# Patient Record
Sex: MALE | Race: WHITE | NOT HISPANIC OR LATINO | Employment: OTHER | URBAN - NONMETROPOLITAN AREA
[De-identification: names, ages, dates, MRNs, and addresses within clinical notes are randomized per-mention and may not be internally consistent; named-entity substitution may affect disease eponyms.]

---

## 2021-04-19 ENCOUNTER — HOSPITAL ENCOUNTER (OUTPATIENT)
Dept: RADIOLOGY | Facility: HOSPITAL | Age: 76
Discharge: HOME | End: 2021-04-19
Attending: INTERNAL MEDICINE
Payer: MEDICARE

## 2021-04-19 DIAGNOSIS — M25.519 SHOULDER PAIN: ICD-10-CM

## 2021-04-19 DIAGNOSIS — M25.519 SHOULDER PAIN: Primary | ICD-10-CM

## 2021-04-19 PROCEDURE — 73030 X-RAY EXAM OF SHOULDER: CPT | Mod: TC,RT

## 2021-04-19 PROCEDURE — 73030 X-RAY EXAM OF SHOULDER: CPT | Mod: TC,LT

## 2021-09-02 ENCOUNTER — HOSPITAL ENCOUNTER (EMERGENCY)
Facility: HOSPITAL | Age: 76
Discharge: HOME | End: 2021-09-02
Attending: EMERGENCY MEDICINE
Payer: MEDICARE

## 2021-09-02 VITALS
HEART RATE: 104 BPM | RESPIRATION RATE: 18 BRPM | WEIGHT: 213.81 LBS | OXYGEN SATURATION: 98 % | SYSTOLIC BLOOD PRESSURE: 120 MMHG | BODY MASS INDEX: 30.61 KG/M2 | DIASTOLIC BLOOD PRESSURE: 65 MMHG | HEIGHT: 70 IN | TEMPERATURE: 98 F
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
## 2021-09-02 DIAGNOSIS — M25.532 ACUTE PAIN OF LEFT WRIST: ICD-10-CM

## 2021-09-02 PROCEDURE — 25000003 PHARM REV CODE 250

## 2021-09-02 PROCEDURE — 99283 EMERGENCY DEPT VISIT LOW MDM: CPT | Mod: 25

## 2021-09-02 PROCEDURE — 29125 APPL SHORT ARM SPLINT STATIC: CPT | Mod: LT

## 2021-09-02 RX ORDER — OXYCODONE AND ACETAMINOPHEN 10; 325 MG/1; MG/1
TABLET ORAL
Status: COMPLETED
Start: 2021-09-02 | End: 2021-09-02

## 2021-09-02 RX ORDER — HYDROCODONE BITARTRATE AND ACETAMINOPHEN 10; 325 MG/1; MG/1
1 TABLET ORAL EVERY 6 HOURS PRN
Qty: 20 TABLET | Refills: 0 | Status: SHIPPED | OUTPATIENT
Start: 2021-09-02 | End: 2021-09-07

## 2021-09-02 RX ORDER — WARFARIN SODIUM 5 MG/1
5 TABLET ORAL DAILY
COMMUNITY
End: 2022-12-15

## 2021-09-02 RX ORDER — WARFARIN 2 MG/1
2 TABLET ORAL DAILY
COMMUNITY
End: 2022-12-15

## 2021-09-02 RX ADMIN — OXYCODONE AND ACETAMINOPHEN: 10; 325 TABLET ORAL at 09:09

## 2022-08-05 PROBLEM — R87.810 CERVICAL HIGH RISK HUMAN PAPILLOMAVIRUS (HPV) DNA TEST POSITIVE: Status: ACTIVE | Noted: 2022-08-05

## 2022-08-05 PROBLEM — J30.9 ALLERGIC RHINITIS: Status: ACTIVE | Noted: 2022-08-05

## 2022-08-05 PROBLEM — R53.83 MALAISE AND FATIGUE: Status: ACTIVE | Noted: 2022-08-05

## 2022-08-05 PROBLEM — R53.81 MALAISE AND FATIGUE: Status: ACTIVE | Noted: 2022-08-05

## 2022-08-05 PROBLEM — M10.9 GOUT: Status: ACTIVE | Noted: 2022-08-05

## 2022-08-05 PROBLEM — B00.9 HERPES SIMPLEX: Status: ACTIVE | Noted: 2022-08-05

## 2023-07-05 ENCOUNTER — LAB VISIT (OUTPATIENT)
Dept: LAB | Facility: HOSPITAL | Age: 78
End: 2023-07-05
Attending: INTERNAL MEDICINE
Payer: MEDICARE

## 2023-07-05 DIAGNOSIS — Z79.01 LONG TERM (CURRENT) USE OF ANTICOAGULANTS: Primary | ICD-10-CM

## 2023-07-05 LAB
INR PPP: 2.3 (ref 0.8–1.2)
PROTHROMBIN TIME: 23.1 SEC (ref 9–12.5)

## 2023-07-05 PROCEDURE — 36415 COLL VENOUS BLD VENIPUNCTURE: CPT | Performed by: INTERNAL MEDICINE

## 2023-07-05 PROCEDURE — 85610 PROTHROMBIN TIME: CPT | Performed by: INTERNAL MEDICINE

## 2023-08-23 ENCOUNTER — LAB VISIT (OUTPATIENT)
Dept: LAB | Facility: HOSPITAL | Age: 78
End: 2023-08-23
Attending: INTERNAL MEDICINE
Payer: MEDICARE

## 2023-08-23 DIAGNOSIS — I48.20 CHRONIC ATRIAL FIBRILLATION: Primary | ICD-10-CM

## 2023-08-23 LAB
INR PPP: 2.8 (ref 0.8–1.2)
PROTHROMBIN TIME: 25.9 SEC (ref 9–12.5)

## 2023-08-23 PROCEDURE — 85610 PROTHROMBIN TIME: CPT | Performed by: INTERNAL MEDICINE

## 2023-08-23 PROCEDURE — 36415 COLL VENOUS BLD VENIPUNCTURE: CPT | Performed by: INTERNAL MEDICINE

## 2023-09-20 ENCOUNTER — LAB VISIT (OUTPATIENT)
Dept: LAB | Facility: HOSPITAL | Age: 78
End: 2023-09-20
Attending: INTERNAL MEDICINE
Payer: MEDICARE

## 2023-09-20 DIAGNOSIS — I48.20 CHRONIC ATRIAL FIBRILLATION: Primary | ICD-10-CM

## 2023-09-20 LAB
INR PPP: 2.9 (ref 0.8–1.2)
PROTHROMBIN TIME: 29.2 SEC (ref 9–12.5)

## 2023-09-20 PROCEDURE — 36415 COLL VENOUS BLD VENIPUNCTURE: CPT | Performed by: INTERNAL MEDICINE

## 2023-10-04 ENCOUNTER — PATIENT MESSAGE (OUTPATIENT)
Dept: ADMINISTRATIVE | Facility: OTHER | Age: 78
End: 2023-10-04
Payer: MEDICARE

## 2023-10-18 ENCOUNTER — LAB VISIT (OUTPATIENT)
Dept: LAB | Facility: HOSPITAL | Age: 78
End: 2023-10-18
Attending: INTERNAL MEDICINE
Payer: MEDICARE

## 2023-10-18 DIAGNOSIS — I48.20 CHRONIC ATRIAL FIBRILLATION: Primary | ICD-10-CM

## 2023-10-18 LAB
INR PPP: 2.7 (ref 0.8–1.2)
PROTHROMBIN TIME: 24.7 SEC (ref 9–12.5)

## 2023-10-18 PROCEDURE — 36415 COLL VENOUS BLD VENIPUNCTURE: CPT | Performed by: INTERNAL MEDICINE

## 2023-10-18 PROCEDURE — 85610 PROTHROMBIN TIME: CPT | Performed by: INTERNAL MEDICINE

## 2023-11-22 ENCOUNTER — LAB VISIT (OUTPATIENT)
Dept: LAB | Facility: HOSPITAL | Age: 78
End: 2023-11-22
Attending: INTERNAL MEDICINE
Payer: MEDICARE

## 2023-11-22 DIAGNOSIS — I48.91 A-FIB: ICD-10-CM

## 2023-11-22 LAB
INR PPP: 2.5 (ref 0.8–1.2)
PROTHROMBIN TIME: 22.9 SEC (ref 9–12.5)

## 2023-11-22 PROCEDURE — 85610 PROTHROMBIN TIME: CPT | Performed by: INTERNAL MEDICINE

## 2023-11-22 PROCEDURE — 36415 COLL VENOUS BLD VENIPUNCTURE: CPT | Performed by: INTERNAL MEDICINE

## 2023-12-20 ENCOUNTER — LAB VISIT (OUTPATIENT)
Dept: LAB | Facility: HOSPITAL | Age: 78
End: 2023-12-20
Attending: INTERNAL MEDICINE
Payer: MEDICARE

## 2023-12-20 DIAGNOSIS — I48.20 CHRONIC ATRIAL FIBRILLATION: Primary | ICD-10-CM

## 2023-12-20 LAB
INR PPP: 3.2 (ref 0.8–1.2)
PROTHROMBIN TIME: 29.1 SEC (ref 9–12.5)

## 2023-12-20 PROCEDURE — 36415 COLL VENOUS BLD VENIPUNCTURE: CPT | Performed by: INTERNAL MEDICINE

## 2023-12-20 PROCEDURE — 85610 PROTHROMBIN TIME: CPT | Performed by: INTERNAL MEDICINE

## 2024-02-01 ENCOUNTER — LAB VISIT (OUTPATIENT)
Dept: LAB | Facility: HOSPITAL | Age: 79
End: 2024-02-01
Attending: INTERNAL MEDICINE
Payer: MEDICARE

## 2024-02-01 DIAGNOSIS — Z79.01 LONG TERM (CURRENT) USE OF ANTICOAGULANTS: Primary | ICD-10-CM

## 2024-02-01 LAB
INR PPP: 4 (ref 0.8–1.2)
PROTHROMBIN TIME: 39.8 SEC (ref 9–12.5)

## 2024-02-01 PROCEDURE — 85610 PROTHROMBIN TIME: CPT | Performed by: INTERNAL MEDICINE

## 2024-02-01 PROCEDURE — 36415 COLL VENOUS BLD VENIPUNCTURE: CPT | Performed by: INTERNAL MEDICINE

## 2024-02-02 ENCOUNTER — LAB VISIT (OUTPATIENT)
Dept: LAB | Facility: HOSPITAL | Age: 79
End: 2024-02-02
Attending: INTERNAL MEDICINE
Payer: MEDICARE

## 2024-02-02 DIAGNOSIS — E11.9 TYPE 2 DIABETES MELLITUS WITHOUT COMPLICATIONS: ICD-10-CM

## 2024-02-02 DIAGNOSIS — E11.69 TYPE 2 DIABETES MELLITUS WITH OTHER SPECIFIED COMPLICATION, WITH LONG-TERM CURRENT USE OF INSULIN: ICD-10-CM

## 2024-02-02 DIAGNOSIS — Z12.5 SCREENING PSA (PROSTATE SPECIFIC ANTIGEN): ICD-10-CM

## 2024-02-02 DIAGNOSIS — Z13.220 SCREENING FOR LIPOID DISORDERS: ICD-10-CM

## 2024-02-02 DIAGNOSIS — I10 PRIMARY HYPERTENSION: ICD-10-CM

## 2024-02-02 DIAGNOSIS — Z13.29 SCREENING FOR THYROID DISORDER: ICD-10-CM

## 2024-02-02 DIAGNOSIS — Z79.4 TYPE 2 DIABETES MELLITUS WITH OTHER SPECIFIED COMPLICATION, WITH LONG-TERM CURRENT USE OF INSULIN: ICD-10-CM

## 2024-02-02 LAB
ALBUMIN SERPL BCP-MCNC: 3.7 G/DL (ref 3.5–5.2)
ALP SERPL-CCNC: 47 U/L (ref 55–135)
ALT SERPL W/O P-5'-P-CCNC: 57 U/L (ref 10–44)
ANION GAP SERPL CALC-SCNC: 6 MMOL/L (ref 3–11)
AST SERPL-CCNC: 30 U/L (ref 10–40)
BASOPHILS # BLD AUTO: 0.05 K/UL (ref 0–0.2)
BASOPHILS NFR BLD: 0.8 % (ref 0–1.9)
BILIRUB SERPL-MCNC: 1.6 MG/DL (ref 0.1–1)
BUN SERPL-MCNC: 12 MG/DL (ref 8–23)
CALCIUM SERPL-MCNC: 9.1 MG/DL (ref 8.7–10.5)
CHLORIDE SERPL-SCNC: 104 MMOL/L (ref 95–110)
CHOLEST SERPL-MCNC: 122 MG/DL (ref 120–199)
CHOLEST/HDLC SERPL: 2.2 {RATIO} (ref 2–5)
CO2 SERPL-SCNC: 28 MMOL/L (ref 23–29)
COMPLEXED PSA SERPL-MCNC: 1.3 NG/ML (ref 0–4)
CREAT SERPL-MCNC: 0.9 MG/DL (ref 0.5–1.4)
DIFFERENTIAL METHOD BLD: ABNORMAL
EOSINOPHIL # BLD AUTO: 0.3 K/UL (ref 0–0.5)
EOSINOPHIL NFR BLD: 5.8 % (ref 0–8)
ERYTHROCYTE [DISTWIDTH] IN BLOOD BY AUTOMATED COUNT: 14 % (ref 11.5–14.5)
EST. GFR  (NO RACE VARIABLE): >60 ML/MIN/1.73 M^2
GLUCOSE SERPL-MCNC: 141 MG/DL (ref 70–110)
HCT VFR BLD AUTO: 45.9 % (ref 40–54)
HDLC SERPL-MCNC: 55 MG/DL (ref 40–75)
HDLC SERPL: 45.1 % (ref 20–50)
HGB BLD-MCNC: 15.5 G/DL (ref 14–18)
IMM GRANULOCYTES # BLD AUTO: 0.02 K/UL (ref 0–0.04)
IMM GRANULOCYTES NFR BLD AUTO: 0.3 % (ref 0–0.5)
LDLC SERPL CALC-MCNC: 53.2 MG/DL (ref 63–159)
LYMPHOCYTES # BLD AUTO: 1.7 K/UL (ref 1–4.8)
LYMPHOCYTES NFR BLD: 28.6 % (ref 18–48)
MCH RBC QN AUTO: 31.1 PG (ref 27–31)
MCHC RBC AUTO-ENTMCNC: 33.8 G/DL (ref 32–36)
MCV RBC AUTO: 92 FL (ref 82–98)
MONOCYTES # BLD AUTO: 0.6 K/UL (ref 0.3–1)
MONOCYTES NFR BLD: 10 % (ref 4–15)
NEUTROPHILS # BLD AUTO: 3.2 K/UL (ref 1.8–7.7)
NEUTROPHILS NFR BLD: 54.5 % (ref 38–73)
NONHDLC SERPL-MCNC: 67 MG/DL
NRBC BLD-RTO: 0 /100 WBC
PLATELET # BLD AUTO: 161 K/UL (ref 150–450)
PMV BLD AUTO: 10.9 FL (ref 9.2–12.9)
POTASSIUM SERPL-SCNC: 4 MMOL/L (ref 3.5–5.1)
PROT SERPL-MCNC: 7.7 G/DL (ref 6–8.4)
RBC # BLD AUTO: 4.98 M/UL (ref 4.6–6.2)
SODIUM SERPL-SCNC: 138 MMOL/L (ref 136–145)
TRIGL SERPL-MCNC: 69 MG/DL (ref 30–150)
TSH SERPL DL<=0.005 MIU/L-ACNC: 3.42 UIU/ML (ref 0.4–4)
WBC # BLD AUTO: 5.91 K/UL (ref 3.9–12.7)

## 2024-02-02 PROCEDURE — 80061 LIPID PANEL: CPT | Performed by: INTERNAL MEDICINE

## 2024-02-02 PROCEDURE — 36415 COLL VENOUS BLD VENIPUNCTURE: CPT | Performed by: INTERNAL MEDICINE

## 2024-02-02 PROCEDURE — 84443 ASSAY THYROID STIM HORMONE: CPT | Performed by: INTERNAL MEDICINE

## 2024-02-02 PROCEDURE — 80053 COMPREHEN METABOLIC PANEL: CPT | Performed by: INTERNAL MEDICINE

## 2024-02-02 PROCEDURE — 84153 ASSAY OF PSA TOTAL: CPT | Performed by: INTERNAL MEDICINE

## 2024-02-02 PROCEDURE — 85025 COMPLETE CBC W/AUTO DIFF WBC: CPT | Performed by: INTERNAL MEDICINE

## 2024-02-15 ENCOUNTER — LAB VISIT (OUTPATIENT)
Dept: LAB | Facility: HOSPITAL | Age: 79
End: 2024-02-15
Attending: INTERNAL MEDICINE
Payer: MEDICARE

## 2024-02-15 DIAGNOSIS — I48.20 CHRONIC ATRIAL FIBRILLATION: ICD-10-CM

## 2024-02-15 DIAGNOSIS — Z79.01 LONG TERM (CURRENT) USE OF ANTICOAGULANTS: ICD-10-CM

## 2024-02-15 LAB
INR PPP: 2.5 (ref 0.8–1.2)
PROTHROMBIN TIME: 24.8 SEC (ref 9–12.5)

## 2024-02-15 PROCEDURE — 36415 COLL VENOUS BLD VENIPUNCTURE: CPT | Performed by: INTERNAL MEDICINE

## 2024-02-15 PROCEDURE — 85610 PROTHROMBIN TIME: CPT | Performed by: INTERNAL MEDICINE

## 2024-06-07 ENCOUNTER — HOSPITAL ENCOUNTER (EMERGENCY)
Facility: HOSPITAL | Age: 79
Discharge: HOME | End: 2024-06-07
Attending: EMERGENCY MEDICINE
Payer: MEDICARE

## 2024-06-07 VITALS
DIASTOLIC BLOOD PRESSURE: 71 MMHG | BODY MASS INDEX: 30.64 KG/M2 | WEIGHT: 214 LBS | HEIGHT: 70 IN | RESPIRATION RATE: 20 BRPM | HEART RATE: 99 BPM | TEMPERATURE: 99 F | SYSTOLIC BLOOD PRESSURE: 129 MMHG | OXYGEN SATURATION: 98 %

## 2024-06-07 DIAGNOSIS — R05.9 COUGH: Primary | ICD-10-CM

## 2024-06-07 LAB
CTP QC/QA: YES
CTP QC/QA: YES
POC MOLECULAR INFLUENZA A AGN: NEGATIVE
POC MOLECULAR INFLUENZA B AGN: NEGATIVE
SARS-COV-2 RDRP RESP QL NAA+PROBE: NEGATIVE

## 2024-06-07 PROCEDURE — 63600175 PHARM REV CODE 636 W HCPCS: Performed by: EMERGENCY MEDICINE

## 2024-06-07 PROCEDURE — 96372 THER/PROPH/DIAG INJ SC/IM: CPT | Performed by: EMERGENCY MEDICINE

## 2024-06-07 PROCEDURE — 99284 EMERGENCY DEPT VISIT MOD MDM: CPT | Mod: 25

## 2024-06-07 PROCEDURE — 87502 INFLUENZA DNA AMP PROBE: CPT

## 2024-06-07 PROCEDURE — 87635 SARS-COV-2 COVID-19 AMP PRB: CPT | Performed by: EMERGENCY MEDICINE

## 2024-06-07 RX ORDER — METHYLPREDNISOLONE ACETATE 80 MG/ML
80 INJECTION, SUSPENSION INTRA-ARTICULAR; INTRALESIONAL; INTRAMUSCULAR; SOFT TISSUE
Status: COMPLETED | OUTPATIENT
Start: 2024-06-07 | End: 2024-06-07

## 2024-06-07 RX ORDER — DOXYCYCLINE 100 MG/1
100 CAPSULE ORAL 2 TIMES DAILY
Qty: 20 CAPSULE | Refills: 0 | Status: SHIPPED | OUTPATIENT
Start: 2024-06-07 | End: 2024-06-17

## 2024-06-07 RX ORDER — PROMETHAZINE HYDROCHLORIDE AND DEXTROMETHORPHAN HYDROBROMIDE 6.25; 15 MG/5ML; MG/5ML
5 SYRUP ORAL EVERY 6 HOURS PRN
Qty: 118 ML | Refills: 0 | Status: SHIPPED | OUTPATIENT
Start: 2024-06-07 | End: 2024-06-17

## 2024-06-07 RX ADMIN — METHYLPREDNISOLONE ACETATE 80 MG: 80 INJECTION, SUSPENSION INTRA-ARTICULAR; INTRALESIONAL; INTRAMUSCULAR; SOFT TISSUE at 04:06

## 2024-06-07 NOTE — ED PROVIDER NOTES
Encounter Date: 6/7/2024       History     Chief Complaint   Patient presents with    General Illness     Reports generalized body aches, chills, runny nose, subjective fever since around 0100 this morning. Denies cough, n/v/d     79-year-old male complaining of cough cold congestion body aches chills runny nose subjective fever since earlier this morning.  No other issues.  No chest pain or shortness of breath.  No nausea vomiting diarrhea.  Not ill appearing, afebrile here.  Speaking in full sentences without issue.  Alert oriented x4, GCS is 15      Review of patient's allergies indicates:   Allergen Reactions    Ace inhibitors      Past Medical History:   Diagnosis Date    Abnormal granulation tissue     Actinic keratosis     Anxiety disorder     Arthralgia of shoulder     Atrial fibrillation     CAD (coronary artery disease)     Cataract     Cervical pain (neck)     Colon polyp     Diverticulosis of large intestine without perforation or abscess without bleeding     Edema     Erythema nodosum     Excessive daytime sleepiness     Headache     Herpes simplex     High cholesterol     HLD (hyperlipidemia)     HPV in male     HTN (hypertension)     Irritability     Lumbar pain     Lumbosacral radiculopathy     Major depressive disorder     Nocturia     HENRI (obstructive sleep apnea)     Osteoarthritis of knee     Palpitations     Rotator cuff arthropathy     Shoulder pain     Thoracic radiculopathy     Type II diabetes mellitus     Urinary frequency      Past Surgical History:   Procedure Laterality Date    COLONOSCOPY  2018    EYE SURGERY  06/20/2022    Approximate date    PROSTATE REDUCTION   2013    Sue     Family History   Problem Relation Name Age of Onset    Diabetes Mother      Coronary artery disease Father Panda Sr     Heart attack Father Panda Sr     Heart disease Father Panda Sr     Cancer Sister          lung    Diabetes Sister      Heart attack Brother      Diabetes Brother      Coronary artery  disease Brother      Cancer Daughter Aliya         Breast cancer w/ mastectomy    Depression Daughter Aliya     Diabetes Daughter Aliya     Heart disease Daughter Aliya         CABG    Hyperlipidemia Daughter Aliya     Miscarriages / Stillbirths Daughter Aliya     Depression Son Lou     Diabetes Son Lou     Heart disease Son Lou         CABG    Hypertension Son Lou      Social History     Tobacco Use    Smoking status: Never    Smokeless tobacco: Never   Substance Use Topics    Alcohol use: Yes     Alcohol/week: 20.0 standard drinks of alcohol     Types: 20 Cans of beer per week    Drug use: Never     Review of Systems   Constitutional:  Negative for fever.   HENT:  Positive for congestion. Negative for sore throat.    Respiratory:  Positive for cough. Negative for shortness of breath.    Cardiovascular:  Negative for chest pain.   Gastrointestinal:  Negative for nausea.   Genitourinary:  Negative for dysuria.   Musculoskeletal:  Negative for back pain.   Skin:  Negative for rash.   Neurological:  Negative for weakness.   Hematological:  Does not bruise/bleed easily.   All other systems reviewed and are negative.      Physical Exam     Initial Vitals [06/07/24 1534]   BP Pulse Resp Temp SpO2   129/71 107 18 98.5 °F (36.9 °C) (!) 94 %      MAP       --         Physical Exam    Nursing note and vitals reviewed.  Constitutional: He appears well-developed and well-nourished. He is not diaphoretic. No distress.   HENT:   Head: Normocephalic and atraumatic.   Eyes: Conjunctivae and EOM are normal. Pupils are equal, round, and reactive to light. Right eye exhibits no discharge. Left eye exhibits no discharge. No scleral icterus.   Neck: Neck supple. No JVD present.   Normal range of motion.  Cardiovascular:  Normal rate, normal heart sounds and intact distal pulses.           No murmur heard.  Pulmonary/Chest: Breath sounds normal. No stridor. No respiratory distress. He has no wheezes. He has no rhonchi. He has no  rales. He exhibits no tenderness.   Abdominal: Abdomen is soft. Bowel sounds are normal. He exhibits no distension and no mass. There is no abdominal tenderness. There is no rebound and no guarding.   Musculoskeletal:         General: No tenderness or edema. Normal range of motion.      Cervical back: Normal range of motion and neck supple.     Neurological: He is alert and oriented to person, place, and time. He has normal strength. GCS score is 15. GCS eye subscore is 4. GCS verbal subscore is 5. GCS motor subscore is 6.   Skin: Skin is warm and dry. Capillary refill takes less than 2 seconds.         ED Course   Procedures  Labs Reviewed   SARS-COV-2 RDRP GENE    Narrative:     ..This test utilizes isothermal nucleic acid amplification technology to detect the SARS-CoV-2 RdRp nucleic acid segment. The analytical sensitivity (limit of detection) is 500 copies/swab.     A POSITIVE result is indicative of the presence of SARS-CoV-2 RNA; clinical correlation with patient history and other diagnostic information is necessary to determine patient infection status.    A NEGATIVE result means that SARS-CoV-2 nucleic acids are not present above the limit of detection. A NEGATIVE result should be treated as presumptive. It does not rule out the possibility of COVID-19 and should not be the sole basis for treatment decisions. If COVID-19 is strongly suspected based on clinical and exposure history, re-testing using an alternate molecular assay should be considered.     Commercial kits are provided by [employer].   _________________________________________________________________   The authorized Fact Sheet for Healthcare Providers and the authorized Fact Sheet for Patients of the ID NOW COVID-19 are available on the FDA website:    https://www.fda.gov/media/257618/download      https://www.fda.gov/media/184945/download       POCT INFLUENZA A/B MOLECULAR          Imaging Results              X-Ray Chest 1 View (In  process)                      Medications   methylPREDNISolone acetate injection 80 mg (80 mg Intramuscular Given 6/7/24 1619)     Medical Decision Making  Amount and/or Complexity of Data Reviewed  Labs: ordered.  Radiology: ordered.    Risk  Prescription drug management.               ED Course as of 06/07/24 1636   Fri Jun 07, 2024   1633 Chest x-ray appears negative.  Will cover with an antibiotic as well as cough medicine.  He is stable for discharge and follow-up to primary care physician.  Oxygen saturation is 97% on room air here in the emergency department.  Speaking in full sentences without issue [SD]      ED Course User Index  [SD] Murali Owens MD               Medical Decision Making:   Differential Diagnosis:   URI, viral syndrome, COVID-19, influenza             Clinical Impression:  Final diagnoses:  [R05.9] Cough (Primary)          ED Disposition Condition    Discharge Stable          ED Prescriptions       Medication Sig Dispense Start Date End Date Auth. Provider    doxycycline (VIBRAMYCIN) 100 MG Cap Take 1 capsule (100 mg total) by mouth 2 (two) times daily. for 10 days 20 capsule 6/7/2024 6/17/2024 Murali Owens MD    promethazine-dextromethorphan (PROMETHAZINE-DM) 6.25-15 mg/5 mL Syrp Take 5 mLs by mouth every 6 (six) hours as needed (Cough). 118 mL 6/7/2024 6/17/2024 Murali Owens MD          Follow-up Information       Follow up With Specialties Details Why Contact Info Additional Information    Amol Ellis III, MD Internal Medicine In 2 days  [address] [facility] [address]  [phone]       [place] - Emergency Department Emergency Medicine  As needed, If symptoms worsen [facility] [facility] [id number] [phone] Floor 1             Murali Owens MD  06/07/24 1636

## 2024-06-10 ENCOUNTER — PATIENT OUTREACH (OUTPATIENT)
Dept: EMERGENCY MEDICINE | Facility: HOSPITAL | Age: 79
End: 2024-06-10
Payer: MEDICARE

## 2024-06-10 NOTE — PROGRESS NOTES
Lauren Doan Patient Care Assistant  ED Navigator  Emergency Department    [place]: [facility] [place]  Role: Community Health Worker    Date: 06/10/2024  Patient Name: Tristian Ramirez  MRN: [id number]  PCP: Amol Ellis III, MD    Assessment:     Tristian Ramirez is a 79 y.o. male who has presented to ED for a general illness. Patient has visited the ED 1 times in the past 3 months. Patient did not contact PCP.     ED Navigator Initial Assessment    ED Navigator Enrollment Documentation  Consent to Services  Does patient consent to completing the assessment?: Yes  Contact  Method of Initial Contact: Phone  Transportation  Does the patient have issues with Transportation?: No  Does the patient have transportation to and from healthcare appointments?: Yes  Insurance Coverage  Do you have coverage/adequate coverage?: Yes  Type/kind of coverage: MEDICARE PART A & B  Is patient able to afford co-pays/deductibles?: Yes  Is patient able to afford HME or supplies?: Yes  Does patient have an established [facility] PCP?: Yes  Able to access?: Yes  Does the patient have a lack of adequate coverage?: No  Specialist Appointment  Did the patient come to the ED to see a specialist?: No  Does the patient have a pending specialist referral?: No  Does the patient have a specialist appointment made?: No  PCP Follow Up Appointment  Has the patient had an appointment with a primary care provider in the past year?: Yes  Approximate date: 2/2/24  Provider: Amol Ellis III, MD  Does the patient have a follow up appontment with a PCP?: Yes  Upcoming appointment date:  (Comment: pt is unsure.)  Provider: Amol Ellis III, MD  When was the last time you saw your PCP?: 2/2/24  Medications  Is patient able to afford medication?: Yes  Is patient unable to get medication due to lack of transportation?: No  Psychological  Does the patient have psycho-social concerns?: No  Food  Does the patient have concerns about food?:  Patient called requesting results from visit on 6/7/21. Can be reached at [phone].   No  Communication/Education  Does the patient have limited English proficiency/English not primary language?: No  Does patient have low literacy and/or low health literacy?: Yes  Does patient have concerns with care?: No  Does patient have dissatisfaction with care?: No  Other Financial Concerns  Does the patient have immediate financial distress?: No  Does the patient have general financial concerns?: No  Other Social Barriers/Concerns  Does the patient have any additional barriers or concerns?: None  Primary Barrier  Barriers identified: Financial barrier (health insurance, employment, etc.), Structural barrier (service availability, waiting times, etc.), Cognitive barrier (health literacy, language and communication, etc.)  Root Cause of ED Utilization: Chronic Conditions  Plan to address Chronic Conditions: Encourage patient to contact PCP/specialist for follow up per ED discharge instructions  Next steps: Provided Education  Was education/educational materials provided surrounding PCP services/creating a medical home?: Yes Was education verbal or written?: Verbal, Written     Was education/educational materials provided surrounding low cost, healthy foods?: Yes Was education verbal or written?: Written     Was education/educational materials provided surrounding other items? If so, use comment to explain.: Yes Was education verbal or written?: Written   Plan: Provided information for [facility] On Call 24/7 Nurse triage line, [phone] or 1-866-[facility] ([phone])  Expected Date of Follow Up 1: 7/22/24  Additional Documentation: Pt is well. Pt stated he has been sleeping real good, and does not have any more symptoms. Pt declined assistance to scheduling any appts. Pt has no issues with transportation or insurance, as well as medications. Pt does not use e-mail. Pt has no need for resources at this time. Pt is agreeable to follow-up calls.    ED navigator ensured there was nothing she could help patient with  today. ED navigator offered to schedule appts. ED navigator provided patient with the following via mail: her contact information, the [facility] On Call 24/7 Nurse triage line, [phone] or 1-866-[facility] ([phone]) contact information, and education on (The Right Care at the Right Level information, [facility] Virtual Visit information, and Heart healthy diet tips). ED navigator will follow-up with patient on/around 7/22/2024.    Lauren Doan  ED Navigator  [phone]            Social History     Socioeconomic History    Marital status: Single   Tobacco Use    Smoking status: Never    Smokeless tobacco: Never   Substance and Sexual Activity    Alcohol use: Yes     Alcohol/week: 20.0 standard drinks of alcohol     Types: 20 Cans of beer per week    Drug use: Never     Social Determinants of Health     Financial Resource Strain: Low Risk  (6/10/2024)    Overall Financial Resource Strain (CARDIA)     Difficulty of Paying Living Expenses: Not hard at all   Food Insecurity: No Food Insecurity (6/10/2024)    Hunger Vital Sign     Worried About Running Out of Food in the Last Year: Never true     Ran Out of Food in the Last Year: Never true   Transportation Needs: No Transportation Needs (6/10/2024)    PRAPARE - Transportation     Lack of Transportation (Medical): No     Lack of Transportation (Non-Medical): No   Physical Activity: Patient Unable To Answer (6/10/2024)    Exercise Vital Sign     Days of Exercise per Week: Patient unable to answer     Minutes of Exercise per Session: Patient unable to answer   Stress: No Stress Concern Present (6/10/2024)    [employer] - Occupational Stress Questionnaire     Feeling of Stress : Not at all   Housing Stability: Low Risk  (6/10/2024)    Housing Stability Vital Sign     Unable to Pay for Housing in the Last Year: No     Homeless in the Last Year: No       Plan:     Pt is well. Pt stated he has been sleeping real good, and does not  have any more symptoms. Pt declined assistance to scheduling any appts. Pt has no issues with transportation or insurance, as well as medications. Pt does not use e-mail. Pt has no need for resources at this time. Pt is agreeable to follow-up calls.    ED navigator ensured there was nothing she could help patient with today. ED navigator offered to schedule appts. ED navigator provided patient with the following via mail: her contact information, the [facility] On Call 24/7 Nurse triage line, [phone] or 1-866-[facility] ([phone]) contact information, and education on (The Right Care at the Right Level information, [employer] Virtual Visit information, and Heart healthy diet tips). ED navigator will follow-up with patient on/around 7/22/2024.    Lauren Doan  ED Navigator  [phone]

## 2024-07-22 ENCOUNTER — PATIENT OUTREACH (OUTPATIENT)
Dept: EMERGENCY MEDICINE | Facility: HOSPITAL | Age: 79
End: 2024-07-22
Payer: MEDICARE

## 2024-07-22 NOTE — PROGRESS NOTES
Pt is unreachable for 1st F/U. ED navigator will follow-up with patient on/around 8/27/2024 for attempt at 2nd.    Lauren Doan  ED Navigator  [phone]

## 2024-08-26 ENCOUNTER — PATIENT OUTREACH (OUTPATIENT)
Dept: EMERGENCY MEDICINE | Facility: HOSPITAL | Age: 79
End: 2024-08-26
Payer: MEDICARE

## 2024-08-26 NOTE — PROGRESS NOTES
Pt is unreachable for 2nd F/U. ED navigator will follow-up with patient on/around 10/1/2024 for attempt at 3rd.     Lauren Doan  ED Navigator  [phone]

## 2024-10-01 ENCOUNTER — PATIENT OUTREACH (OUTPATIENT)
Dept: EMERGENCY MEDICINE | Facility: HOSPITAL | Age: 79
End: 2024-10-01
Payer: MEDICARE

## 2025-02-02 ENCOUNTER — HOSPITAL ENCOUNTER (EMERGENCY)
Facility: HOSPITAL | Age: 80
Discharge: HOME | End: 2025-02-02
Attending: EMERGENCY MEDICINE
Payer: MEDICARE

## 2025-02-02 VITALS
HEART RATE: 76 BPM | RESPIRATION RATE: 18 BRPM | SYSTOLIC BLOOD PRESSURE: 115 MMHG | WEIGHT: 205 LBS | DIASTOLIC BLOOD PRESSURE: 65 MMHG | TEMPERATURE: 98 F | OXYGEN SATURATION: 98 % | HEIGHT: 70 IN | BODY MASS INDEX: 29.35 KG/M2

## 2025-02-02 DIAGNOSIS — S80.211A ABRASION OF RIGHT KNEE, INITIAL ENCOUNTER: ICD-10-CM

## 2025-02-02 DIAGNOSIS — S51.011A SKIN TEAR OF RIGHT ELBOW WITHOUT COMPLICATION, INITIAL ENCOUNTER: ICD-10-CM
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
## 2025-02-02 PROCEDURE — 99282 EMERGENCY DEPT VISIT SF MDM: CPT

## 2025-02-03 NOTE — ED PROVIDER NOTES
Encounter Date: 2/2/2025       History     Chief Complaint   Patient presents with    Fall     Patient fell onto cement at 10 am this morning after tripping over his shoes and fell onto cement from standing height. Skin tear to right elbow. On coumadin. Denies hitting head. No loc     78 yo male here with complaint of abrasion to R knee, skin tear to R elbow after ground level fall earlier today. No limitation of ROM. Did not strike head. Denies neck/back pain.       Review of patient's allergies indicates:   Allergen Reactions    Ace inhibitors      Past Medical History:   Diagnosis Date    Abnormal granulation tissue     Actinic keratosis     Anxiety disorder     Arthralgia of shoulder     Atrial fibrillation     CAD (coronary artery disease)     Cataract     Cervical pain (neck)     Colon polyp     Diverticulosis of large intestine without perforation or abscess without bleeding     Edema     Erythema nodosum     Excessive daytime sleepiness     Headache     Herpes simplex     High cholesterol     HLD (hyperlipidemia)     HPV in male     HTN (hypertension)     Irritability     Lumbar pain     Lumbosacral radiculopathy     Major depressive disorder     Nocturia     HENRI (obstructive sleep apnea)     Osteoarthritis of knee     Palpitations     Rotator cuff arthropathy     Shoulder pain     Thoracic radiculopathy     Type II diabetes mellitus     Urinary frequency      Past Surgical History:   Procedure Laterality Date    COLONOSCOPY  2018    EYE SURGERY  06/20/2022    Approximate date    PROSTATE REDUCTION   2013    Sue     Family History   Problem Relation Name Age of Onset    Diabetes Mother      Coronary artery disease Father Panda Sr     Heart attack Father Panda Sr     Heart disease Father Panda Sr     Cancer Sister          lung    Diabetes Sister      Heart attack Brother      Diabetes Brother      Coronary artery disease Brother      Cancer Daughter Aliya         Breast cancer w/ mastectomy     Depression Daughter Aliya     Diabetes Daughter Aliya     Heart disease Daughter Aliya         CABG    Hyperlipidemia Daughter Aliya     Miscarriages / Stillbirths Daughter Aliya     Depression Son Lou     Diabetes Son Lou     Heart disease Son Lou         CABG    Hypertension Son Lou      Social History     Tobacco Use    Smoking status: Never    Smokeless tobacco: Never   Substance Use Topics    Alcohol use: Yes     Alcohol/week: 20.0 standard drinks of alcohol     Types: 20 Cans of beer per week    Drug use: Never     Review of Systems   Constitutional: Negative.    Respiratory: Negative.     Cardiovascular: Negative.    Gastrointestinal: Negative.    All other systems reviewed and are negative.      Physical Exam     Initial Vitals [02/02/25 1853]   BP Pulse Resp Temp SpO2   111/61 77 18 97.9 °F (36.6 °C) 97 %      MAP       --         Physical Exam    Nursing note and vitals reviewed.  Constitutional: He is not diaphoretic. No distress.   HENT:   Head: Normocephalic and atraumatic.   Eyes: EOM are normal. Pupils are equal, round, and reactive to light.   Neck: Neck supple.   Normal range of motion.  Cardiovascular:  Normal rate, regular rhythm and intact distal pulses.           Pulmonary/Chest: Breath sounds normal. No respiratory distress. He has no wheezes. He has no rales.   Abdominal: Abdomen is soft. Bowel sounds are normal. He exhibits no distension. There is no abdominal tenderness. There is no rebound.   Musculoskeletal:         General: No tenderness or edema. Normal range of motion.      Cervical back: Normal range of motion and neck supple.     Neurological: He is alert and oriented to person, place, and time.   Skin: Skin is warm and dry. Capillary refill takes less than 2 seconds.   Skin tear without active bleeding R elbow. Abrasions R knee.   Psychiatric: He has a normal mood and affect. Thought content normal.         ED Course   Procedures  Labs Reviewed - No data to display        Imaging Results    None          Medications - No data to display  Medical Decision Making  Vitals are normal. Exam is not concerning for fracture. Wounds dressed. No active bleeding.     Problems Addressed:  Abrasion of right knee, initial encounter: self-limited or minor problem  Fall, initial encounter: acute illness or injury  Skin tear of right elbow without complication, initial encounter: acute illness or injury                                      Clinical Impression:  Final diagnoses:  [W19.XXXA] Fall, initial encounter (Primary)  [S51.011A] Skin tear of right elbow without complication, initial encounter  [S80.211A] Abrasion of right knee, initial encounter          ED Disposition Condition    Discharge Stable          ED Prescriptions    None       Follow-up Information       Follow up With Specialties Details Why Contact Info    Amol Ellis III, MD Internal Medicine Schedule an appointment as soon as possible for a visit   [address] [place] [id number]  [phone]               Melo Mendoza MD  02/06/25 0746

## 2025-02-05 ENCOUNTER — HOSPITAL ENCOUNTER (EMERGENCY)
Facility: HOSPITAL | Age: 80
Discharge: HOME | End: 2025-02-05
Attending: EMERGENCY MEDICINE
Payer: MEDICARE

## 2025-02-05 VITALS
WEIGHT: 219 LBS | BODY MASS INDEX: 31.35 KG/M2 | RESPIRATION RATE: 18 BRPM | OXYGEN SATURATION: 98 % | DIASTOLIC BLOOD PRESSURE: 73 MMHG | HEART RATE: 97 BPM | HEIGHT: 70 IN | SYSTOLIC BLOOD PRESSURE: 132 MMHG | TEMPERATURE: 98 F

## 2025-02-05 DIAGNOSIS — Z51.89 ENCOUNTER FOR WOUND CARE: Primary | ICD-10-CM

## 2025-02-05 PROCEDURE — 99281 EMR DPT VST MAYX REQ PHY/QHP: CPT

## 2025-02-05 NOTE — ED PROVIDER NOTES
Encounter Date: 2/5/2025       History     Chief Complaint   Patient presents with    Arm Pain     Patient had a slip and fall from standing height approx 2 days ago. Patient reports right arm pain and right hand swelling. Patient currently taking Coumadin, denies LOC, denies hitting his head.     79-year-old male status post trip and fall 2 nights ago scraping his right arm and elbow, seen here.  Wrapped.  Having some bleeding and swelling to his arm.  Denies pain.  Here for bandage re wrap.  No head injury.  Alert oriented x4, GCS is 15.  He is on Coumadin for history of atrial fibrillation      Review of patient's allergies indicates:   Allergen Reactions    Ace inhibitors      Past Medical History:   Diagnosis Date    Abnormal granulation tissue     Actinic keratosis     Anxiety disorder     Arthralgia of shoulder     Atrial fibrillation     CAD (coronary artery disease)     Cataract     Cervical pain (neck)     Colon polyp     Diverticulosis of large intestine without perforation or abscess without bleeding     Edema     Erythema nodosum     Excessive daytime sleepiness     Headache     Herpes simplex     High cholesterol     HLD (hyperlipidemia)     HPV in male     HTN (hypertension)     Irritability     Lumbar pain     Lumbosacral radiculopathy     Major depressive disorder     Nocturia     HENRI (obstructive sleep apnea)     Osteoarthritis of knee     Palpitations     Rotator cuff arthropathy     Shoulder pain     Thoracic radiculopathy     Type II diabetes mellitus     Urinary frequency      Past Surgical History:   Procedure Laterality Date    COLONOSCOPY  2018    EYE SURGERY  06/20/2022    Approximate date    PROSTATE REDUCTION   2013    Sue     Family History   Problem Relation Name Age of Onset    Diabetes Mother      Coronary artery disease Father Panda Sr     Heart attack Father Panda Sr     Heart disease Father Panda Sr     Cancer Sister          lung    Diabetes Sister      Heart attack  Brother      Diabetes Brother      Coronary artery disease Brother      Cancer Daughter Aliya         Breast cancer w/ mastectomy    Depression Daughter Aliya     Diabetes Daughter Aliya     Heart disease Daughter Aliya         CABG    Hyperlipidemia Daughter Aliya     Miscarriages / Stillbirths Daughter Aliya     Depression Son Lou     Diabetes Son Lou     Heart disease Son Lou         CABG    Hypertension Son Lou      Social History     Tobacco Use    Smoking status: Never    Smokeless tobacco: Never   Substance Use Topics    Alcohol use: Yes     Alcohol/week: 20.0 standard drinks of alcohol     Types: 20 Cans of beer per week    Drug use: Never     Review of Systems   Constitutional:  Negative for fever.   HENT:  Negative for sore throat.    Respiratory:  Negative for shortness of breath.    Cardiovascular:  Negative for chest pain.   Gastrointestinal:  Negative for nausea.   Genitourinary:  Negative for dysuria.   Musculoskeletal:  Negative for back pain.   Skin:  Positive for wound. Negative for rash.   Neurological:  Negative for weakness.   Hematological:  Does not bruise/bleed easily.   All other systems reviewed and are negative.      Physical Exam     Initial Vitals [02/05/25 0747]   BP Pulse Resp Temp SpO2   132/73 97 18 98 °F (36.7 °C) 98 %      MAP       --         Physical Exam    Nursing note and vitals reviewed.  Constitutional: He appears well-developed and well-nourished. He is not diaphoretic. No distress.   HENT:   Head: Normocephalic and atraumatic.   Eyes: Conjunctivae and EOM are normal. Pupils are equal, round, and reactive to light. Right eye exhibits no discharge. Left eye exhibits no discharge. No scleral icterus.   Neck: Neck supple. No JVD present.   Normal range of motion.  Cardiovascular:  Normal rate, normal heart sounds and intact distal pulses.           No murmur heard.  Pulmonary/Chest: Breath sounds normal. No stridor. No respiratory distress. He has no wheezes. He has no  rhonchi. He has no rales. He exhibits no tenderness.   Abdominal: Abdomen is soft. Bowel sounds are normal. He exhibits no distension and no mass. There is no abdominal tenderness. There is no rebound and no guarding.   Musculoskeletal:         General: No tenderness or edema. Normal range of motion.      Cervical back: Normal range of motion and neck supple.     Neurological: He is alert and oriented to person, place, and time. He has normal strength. GCS score is 15. GCS eye subscore is 4. GCS verbal subscore is 5. GCS motor subscore is 6.   Skin: Skin is warm and dry. Capillary refill takes less than 2 seconds.   Skin tear is noted to right elbow area with mild bleeding.  SurgiSeal and quick clot used to rewrap with gauze and an Ace wrap.  Neurovascularly intact throughout.  Mild swelling noted distally         ED Course   Procedures  Labs Reviewed - No data to display       Imaging Results    None          Medications - No data to display  Medical Decision Making                        Medical Decision Making:   Differential Diagnosis:   Wound care, skin tear             Clinical Impression:  Final diagnoses:  [Z51.89] Encounter for wound care (Primary)          ED Disposition Condition    Discharge Stable          ED Prescriptions    None       Follow-up Information       Follow up With Specialties Details Why Contact Info Additional Information    Primary care physician  In 2 days       [facility] - Emergency Department Emergency Medicine  As needed, If symptoms worsen [facility] [id number] [phone] Floor 1             Murali Owens MD  02/05/25 0800

## 2025-02-20 ENCOUNTER — LAB VISIT (OUTPATIENT)
Dept: LAB | Facility: HOSPITAL | Age: 80
End: 2025-02-20
Attending: INTERNAL MEDICINE
Payer: MEDICARE

## 2025-02-20 DIAGNOSIS — Z79.4 TYPE 2 DIABETES MELLITUS WITH OTHER SPECIFIED COMPLICATION, WITH LONG-TERM CURRENT USE OF INSULIN: ICD-10-CM

## 2025-02-20 DIAGNOSIS — E11.69 TYPE 2 DIABETES MELLITUS WITH OTHER SPECIFIED COMPLICATION, WITH LONG-TERM CURRENT USE OF INSULIN: ICD-10-CM

## 2025-02-20 DIAGNOSIS — E55.9 VITAMIN D DEFICIENCY, UNSPECIFIED: ICD-10-CM

## 2025-02-20 DIAGNOSIS — Z11.59 ENCOUNTER FOR HEPATITIS C SCREENING TEST FOR LOW RISK PATIENT: ICD-10-CM

## 2025-02-20 DIAGNOSIS — M10.9 GOUT, UNSPECIFIED CAUSE, UNSPECIFIED CHRONICITY, UNSPECIFIED SITE: ICD-10-CM

## 2025-02-20 LAB
25(OH)D3+25(OH)D2 SERPL-MCNC: 40 NG/ML (ref 30–96)
ALBUMIN SERPL BCP-MCNC: 4.1 G/DL (ref 3.5–5.2)
ALBUMIN/CREAT UR: 86.9 UG/MG (ref 0–30)
ALP SERPL-CCNC: 53 U/L (ref 55–135)
ALT SERPL W/O P-5'-P-CCNC: 31 U/L (ref 10–44)
ANION GAP SERPL CALC-SCNC: 10 MMOL/L (ref 8–16)
AST SERPL-CCNC: 33 U/L (ref 10–40)
BASOPHILS # BLD AUTO: 0.07 K/UL (ref 0–0.2)
BASOPHILS NFR BLD: 0.9 % (ref 0–1.9)
BILIRUB SERPL-MCNC: 1.8 MG/DL (ref 0.1–1)
BUN SERPL-MCNC: 14 MG/DL (ref 8–23)
CALCIUM SERPL-MCNC: 9.7 MG/DL (ref 8.7–10.5)
CHLORIDE SERPL-SCNC: 102 MMOL/L (ref 95–110)
CHOLEST SERPL-MCNC: 129 MG/DL (ref 120–199)
CHOLEST/HDLC SERPL: 2.5 {RATIO} (ref 2–5)
CO2 SERPL-SCNC: 27 MMOL/L (ref 23–29)
CREAT SERPL-MCNC: 0.9 MG/DL (ref 0.5–1.4)
CREAT UR-MCNC: 49.5 MG/DL (ref 23–375)
DIFFERENTIAL METHOD BLD: ABNORMAL
EOSINOPHIL # BLD AUTO: 0.3 K/UL (ref 0–0.5)
EOSINOPHIL NFR BLD: 4.1 % (ref 0–8)
ERYTHROCYTE [DISTWIDTH] IN BLOOD BY AUTOMATED COUNT: 15.1 % (ref 11.5–14.5)
EST. GFR  (NO RACE VARIABLE): >60 ML/MIN/1.73 M^2
GLUCOSE SERPL-MCNC: 133 MG/DL (ref 70–110)
HCT VFR BLD AUTO: 46.4 % (ref 40–54)
HCV AB SERPL QL IA: NORMAL
HDLC SERPL-MCNC: 52 MG/DL (ref 40–75)
HDLC SERPL: 40.3 % (ref 20–50)
HGB BLD-MCNC: 15.1 G/DL (ref 14–18)
IMM GRANULOCYTES # BLD AUTO: 0.02 K/UL (ref 0–0.04)
IMM GRANULOCYTES NFR BLD AUTO: 0.2 % (ref 0–0.5)
LDLC SERPL CALC-MCNC: 58.4 MG/DL (ref 63–159)
LYMPHOCYTES # BLD AUTO: 1.5 K/UL (ref 1–4.8)
LYMPHOCYTES NFR BLD: 18.6 % (ref 18–48)
MCH RBC QN AUTO: 31.1 PG (ref 27–31)
MCHC RBC AUTO-ENTMCNC: 32.5 G/DL (ref 32–36)
MCV RBC AUTO: 96 FL (ref 82–98)
MICROALBUMIN UR DL<=1MG/L-MCNC: 43 UG/ML
MONOCYTES # BLD AUTO: 0.7 K/UL (ref 0.3–1)
MONOCYTES NFR BLD: 8.8 % (ref 4–15)
NEUTROPHILS # BLD AUTO: 5.5 K/UL (ref 1.8–7.7)
NEUTROPHILS NFR BLD: 67.4 % (ref 38–73)
NONHDLC SERPL-MCNC: 77 MG/DL
NRBC BLD-RTO: 0 /100 WBC
PLATELET # BLD AUTO: 180 K/UL (ref 150–450)
PMV BLD AUTO: 11.2 FL (ref 9.2–12.9)
POTASSIUM SERPL-SCNC: 4.6 MMOL/L (ref 3.5–5.1)
PROT SERPL-MCNC: 7.6 G/DL (ref 6–8.4)
RBC # BLD AUTO: 4.86 M/UL (ref 4.6–6.2)
SODIUM SERPL-SCNC: 139 MMOL/L (ref 136–145)
T4 FREE SERPL-MCNC: 0.89 NG/DL (ref 0.71–1.51)
TRIGL SERPL-MCNC: 93 MG/DL (ref 30–150)
TSH SERPL DL<=0.005 MIU/L-ACNC: 5.29 UIU/ML (ref 0.4–4)
URATE SERPL-MCNC: 4.4 MG/DL (ref 3.4–7)
WBC # BLD AUTO: 8.11 K/UL (ref 3.9–12.7)

## 2025-02-20 PROCEDURE — 86803 HEPATITIS C AB TEST: CPT | Performed by: INTERNAL MEDICINE

## 2025-02-20 PROCEDURE — 80053 COMPREHEN METABOLIC PANEL: CPT | Performed by: INTERNAL MEDICINE

## 2025-02-20 PROCEDURE — 80061 LIPID PANEL: CPT | Performed by: INTERNAL MEDICINE

## 2025-02-20 PROCEDURE — 84443 ASSAY THYROID STIM HORMONE: CPT | Performed by: INTERNAL MEDICINE

## 2025-02-20 PROCEDURE — 85025 COMPLETE CBC W/AUTO DIFF WBC: CPT | Performed by: INTERNAL MEDICINE

## 2025-02-20 PROCEDURE — 84550 ASSAY OF BLOOD/URIC ACID: CPT | Performed by: INTERNAL MEDICINE

## 2025-02-20 PROCEDURE — 84439 ASSAY OF FREE THYROXINE: CPT | Performed by: INTERNAL MEDICINE

## 2025-02-20 PROCEDURE — 36415 COLL VENOUS BLD VENIPUNCTURE: CPT | Performed by: INTERNAL MEDICINE

## 2025-02-20 PROCEDURE — 82306 VITAMIN D 25 HYDROXY: CPT | Performed by: INTERNAL MEDICINE

## 2025-02-20 PROCEDURE — 82570 ASSAY OF URINE CREATININE: CPT | Performed by: INTERNAL MEDICINE

## 2025-02-24 ENCOUNTER — RESULTS FOLLOW-UP (OUTPATIENT)
Dept: HEPATOLOGY | Facility: HOSPITAL | Age: 80
End: 2025-02-24
Payer: MEDICARE

## 2025-02-24 NOTE — TELEPHONE ENCOUNTER
----- Message from Amol Ellis MD sent at 2/24/2025  7:58 AM CST -----  Labs stable and look okay  ----- Message -----  From: Candido, Soft Lab Interface  Sent: 2/20/2025  12:55 PM CST  To: Amol Ellis III, MD

## 2025-02-25 NOTE — TELEPHONE ENCOUNTER
Pt has been contacted and made aware of results below. He requested for the results to be sent to Dr. Cornelius Perez so routed the results to him via epic.

## 2025-03-15 ENCOUNTER — HOSPITAL ENCOUNTER (EMERGENCY)
Facility: HOSPITAL | Age: 80
Discharge: HOME | End: 2025-03-15
Attending: EMERGENCY MEDICINE
Payer: MEDICARE

## 2025-03-15 VITALS
HEART RATE: 85 BPM | RESPIRATION RATE: 20 BRPM | TEMPERATURE: 99 F | HEIGHT: 70 IN | DIASTOLIC BLOOD PRESSURE: 85 MMHG | BODY MASS INDEX: 30.78 KG/M2 | OXYGEN SATURATION: 100 % | WEIGHT: 215 LBS | SYSTOLIC BLOOD PRESSURE: 144 MMHG

## 2025-03-15 DIAGNOSIS — Z48.89 ENCOUNTER FOR POST SURGICAL WOUND CHECK: Primary | ICD-10-CM

## 2025-03-15 PROCEDURE — 25000003 PHARM REV CODE 250: Performed by: EMERGENCY MEDICINE

## 2025-03-15 PROCEDURE — 99283 EMERGENCY DEPT VISIT LOW MDM: CPT

## 2025-03-15 RX ORDER — SILVER NITRATE 38.21; 12.74 MG/1; MG/1
2 STICK TOPICAL
Status: COMPLETED | OUTPATIENT
Start: 2025-03-15 | End: 2025-03-15

## 2025-03-15 RX ADMIN — SILVER NITRATE 2 APPLICATOR: 38.21; 12.74 STICK TOPICAL at 08:03

## 2025-03-16 ENCOUNTER — HOSPITAL ENCOUNTER (EMERGENCY)
Facility: HOSPITAL | Age: 80
Discharge: HOME | End: 2025-03-16
Attending: EMERGENCY MEDICINE
Payer: MEDICARE

## 2025-03-16 VITALS
OXYGEN SATURATION: 99 % | BODY MASS INDEX: 30.78 KG/M2 | HEIGHT: 70 IN | RESPIRATION RATE: 18 BRPM | WEIGHT: 215 LBS | HEART RATE: 75 BPM | SYSTOLIC BLOOD PRESSURE: 144 MMHG | TEMPERATURE: 98 F | DIASTOLIC BLOOD PRESSURE: 75 MMHG

## 2025-03-16 DIAGNOSIS — T14.8XXA BLEEDING FROM WOUND: Primary | ICD-10-CM

## 2025-03-16 LAB
INR PPP: 3.6 (ref 0.8–1.2)
PROTHROMBIN TIME: 36.8 SEC (ref 9–12.5)

## 2025-03-16 PROCEDURE — 63600175 PHARM REV CODE 636 W HCPCS

## 2025-03-16 PROCEDURE — 36415 COLL VENOUS BLD VENIPUNCTURE: CPT

## 2025-03-16 PROCEDURE — 99283 EMERGENCY DEPT VISIT LOW MDM: CPT

## 2025-03-16 PROCEDURE — 25000003 PHARM REV CODE 250

## 2025-03-16 PROCEDURE — 85610 PROTHROMBIN TIME: CPT

## 2025-03-16 RX ORDER — LIDOCAINE HYDROCHLORIDE AND EPINEPHRINE 10; 10 UG/ML; MG/ML
1 INJECTION, SOLUTION INFILTRATION; PERINEURAL ONCE
Status: COMPLETED | OUTPATIENT
Start: 2025-03-16 | End: 2025-03-16

## 2025-03-16 RX ORDER — TRANEXAMIC ACID 100 MG/ML
1000 INJECTION, SOLUTION INTRAVENOUS ONCE
Status: COMPLETED | OUTPATIENT
Start: 2025-03-16 | End: 2025-03-16

## 2025-03-16 RX ADMIN — TRANEXAMIC ACID 1000 MG: 100 INJECTION, SOLUTION INTRAVENOUS at 11:03

## 2025-03-16 RX ADMIN — LIDOCAINE HYDROCHLORIDE,EPINEPHRINE BITARTRATE 1 ML: 10; .01 INJECTION, SOLUTION INFILTRATION; PERINEURAL at 12:03

## 2025-03-16 NOTE — DISCHARGE INSTRUCTIONS
Do not take your Coumadin tonight. Follow up with primary care to recheck your INR/Coumadin level.

## 2025-03-16 NOTE — ED PROVIDER NOTES
"Encounter Date: 3/15/2025       History     Chief Complaint   Patient presents with    Post-op Problem     Pt to the ER with post-op complication. Uncontrolled bleeding, pt had "mole removed in the back, last Friday the 7th" pt on coumadin, pt states "its been bleeding for a week now but now its getting worse"   Procedure done at the [facility] dermatology.      79-year-old male on Coumadin had a mole removed from lower back last week, complaining of constant slow bleeding from the area despite bandages.  No other issue.  Alert oriented x4, GCS is 15.  Has not followed up with the dermatologist      Review of patient's allergies indicates:   Allergen Reactions    Ace inhibitors      Past Medical History:   Diagnosis Date    Abnormal granulation tissue     Actinic keratosis     Anxiety disorder     Arthralgia of shoulder     Atrial fibrillation     CAD (coronary artery disease)     Cataract     Cervical pain (neck)     Colon polyp     Diverticulosis of large intestine without perforation or abscess without bleeding     Edema     Erythema nodosum     Excessive daytime sleepiness     Headache     Herpes simplex     High cholesterol     HLD (hyperlipidemia)     HPV in male     HTN (hypertension)     Irritability     Lumbar pain     Lumbosacral radiculopathy     Major depressive disorder     Nocturia     HENRI (obstructive sleep apnea)     Osteoarthritis of knee     Palpitations     Rotator cuff arthropathy     Shoulder pain     Thoracic radiculopathy     Type II diabetes mellitus     Urinary frequency      Past Surgical History:   Procedure Laterality Date    COLONOSCOPY  2018    EYE SURGERY  06/20/2022    Approximate date    PROSTATE REDUCTION   2013    Sue     Family History   Problem Relation Name Age of Onset    Diabetes Mother      Coronary artery disease Father Panda Sr     Heart attack Father Panda Sr     Heart disease Father Panda Sr     Cancer Sister          lung    Diabetes Sister      Heart attack " Brother      Diabetes Brother      Coronary artery disease Brother      Cancer Daughter Aliya         Breast cancer w/ mastectomy    Depression Daughter Aliya     Diabetes Daughter Aliya     Heart disease Daughter Aliya         CABG    Hyperlipidemia Daughter Aliya     Miscarriages / Stillbirths Daughter Aliya     Depression Son Lou     Diabetes Son Lou     Heart disease Son Lou         CABG    Hypertension Son Lou      Social History[1]  Review of Systems   Constitutional:  Negative for fever.   HENT:  Negative for sore throat.    Respiratory:  Negative for shortness of breath.    Cardiovascular:  Negative for chest pain.   Gastrointestinal:  Negative for nausea.   Genitourinary:  Negative for dysuria.   Musculoskeletal:  Negative for back pain.   Skin:  Negative for rash.   Neurological:  Negative for weakness.   Hematological:  Does not bruise/bleed easily.   All other systems reviewed and are negative.      Physical Exam     Initial Vitals [03/15/25 2001]   BP Pulse Resp Temp SpO2   (!) 144/85 85 20 98.7 °F (37.1 °C) 100 %      MAP       --         Physical Exam    Nursing note and vitals reviewed.  Constitutional: He appears well-developed and well-nourished. He is not diaphoretic. No distress.   HENT:   Head: Normocephalic and atraumatic.   Eyes: Conjunctivae and EOM are normal. Pupils are equal, round, and reactive to light. Right eye exhibits no discharge. Left eye exhibits no discharge. No scleral icterus.   Neck: Neck supple. No JVD present.   Normal range of motion.  Cardiovascular:  Normal rate, normal heart sounds and intact distal pulses.           No murmur heard.  Pulmonary/Chest: Breath sounds normal. No stridor. No respiratory distress. He has no wheezes. He has no rhonchi. He has no rales. He exhibits no tenderness.   Abdominal: Abdomen is soft. Bowel sounds are normal. He exhibits no distension and no mass. There is no abdominal tenderness. There is no rebound and no guarding.    Musculoskeletal:         General: No tenderness or edema. Normal range of motion.      Cervical back: Normal range of motion and neck supple.     Neurological: He is alert and oriented to person, place, and time. He has normal strength. GCS score is 15. GCS eye subscore is 4. GCS verbal subscore is 5. GCS motor subscore is 6.   Skin: Skin is warm and dry. Capillary refill takes less than 2 seconds.   Low back status post mole removal, minimal oozing.  Silver nitrate applied, SurgiSeal applied as well as quick clot.  No bleeding afterwards.  Stable for discharge         ED Course   Procedures  Labs Reviewed - No data to display       Imaging Results    None          Medications   silver nitrate applicators applicator 2 applicator (2 applicators Topical (Top) Given 3/15/25 2017)     Medical Decision Making  Risk  Prescription drug management.                          Medical Decision Making:   Differential Diagnosis:   Postop bleeding             Clinical Impression:  Final diagnoses:  [Z48.89] Encounter for post surgical wound check (Primary)          ED Disposition Condition    Discharge Stable          ED Prescriptions    None       Follow-up Information       Follow up With Specialties Details Why Contact Info Additional Information    Primary care physician  In 2 days       [facility] Emergency Department Emergency Medicine  As needed, If symptoms worsen [address] [place] [id number] [phone] Floor 1               [1]   Social History  Tobacco Use    Smoking status: Never    Smokeless tobacco: Never   Substance Use Topics    Alcohol use: Yes     Alcohol/week: 20.0 standard drinks of alcohol     Types: 20 Cans of beer per week    Drug use: Never        Murali Owens MD  03/15/25 2022

## 2025-03-16 NOTE — ED PROVIDER NOTES
Encounter Date: 3/16/2025       History     Chief Complaint   Patient presents with    Bleeding/Bruising     Patient had moles removed by dermatology approx 1 week ago. On coumadin. Reports persistent bleeding. Was seen in ED last night.     79-year-old male with history of AFib, CAD, diverticulitis, type 2 diabetes presents to ED with complaints of continued bleeding from molar removal.  Patient was seen in ED for similar complaints and reports that bleeding only temporarily stopped, however he woke up this morning with moderate amount of blood on bandages and leaking through his clothes.    The history is provided by the patient.     Review of patient's allergies indicates:   Allergen Reactions    Ace inhibitors      Past Medical History:   Diagnosis Date    Abnormal granulation tissue     Actinic keratosis     Anxiety disorder     Arthralgia of shoulder     Atrial fibrillation     CAD (coronary artery disease)     Cataract     Cervical pain (neck)     Colon polyp     Diverticulosis of large intestine without perforation or abscess without bleeding     Edema     Erythema nodosum     Excessive daytime sleepiness     Headache     Herpes simplex     High cholesterol     HLD (hyperlipidemia)     HPV in male     HTN (hypertension)     Irritability     Lumbar pain     Lumbosacral radiculopathy     Major depressive disorder     Nocturia     HENRI (obstructive sleep apnea)     Osteoarthritis of knee     Palpitations     Rotator cuff arthropathy     Shoulder pain     Thoracic radiculopathy     Type II diabetes mellitus     Urinary frequency      Past Surgical History:   Procedure Laterality Date    COLONOSCOPY  2018    EYE SURGERY  06/20/2022    Approximate date    PROSTATE REDUCTION   2013    Sue     Family History   Problem Relation Name Age of Onset    Diabetes Mother      Coronary artery disease Father Panda Sr     Heart attack Father Panda Sr     Heart disease Father Panda Sr     Cancer Sister          lung     Diabetes Sister      Heart attack Brother      Diabetes Brother      Coronary artery disease Brother      Cancer Daughter Aliya         Breast cancer w/ mastectomy    Depression Daughter Aliya     Diabetes Daughter Aliya     Heart disease Daughter Aliya         CABG    Hyperlipidemia Daughter Aliya     Miscarriages / Stillbirths Daughter Aliya     Depression Son Lou     Diabetes Son Lou     Heart disease Son Lou         CABG    Hypertension Son Lou      Social History[1]  Review of Systems   Constitutional:  Negative for fever.   HENT:  Negative for sore throat.    Eyes: Negative.    Respiratory:  Negative for shortness of breath.    Cardiovascular:  Negative for chest pain.   Gastrointestinal:  Negative for nausea.   Endocrine: Negative.    Genitourinary:  Negative for dysuria.   Musculoskeletal:  Negative for back pain.   Skin:  Positive for wound. Negative for rash.   Allergic/Immunologic: Negative.    Neurological:  Negative for weakness.   Hematological:  Does not bruise/bleed easily.   Psychiatric/Behavioral: Negative.         Physical Exam     Initial Vitals [03/16/25 1001]   BP Pulse Resp Temp SpO2   (!) 167/83 81 16 98.3 °F (36.8 °C) 97 %      MAP       --         Physical Exam    Nursing note and vitals reviewed.  Constitutional: He appears well-developed and well-nourished.   HENT:   Head: Normocephalic and atraumatic.   Eyes: EOM are normal.   Neck: Neck supple.   Normal range of motion.  Cardiovascular:  Normal rate and regular rhythm.           Pulmonary/Chest: No respiratory distress.   Abdominal: He exhibits no distension.   Musculoskeletal:         General: Normal range of motion.      Cervical back: Normal range of motion and neck supple.     Neurological: He is alert and oriented to person, place, and time.   Skin: Skin is warm and dry.        Psychiatric: He has a normal mood and affect. Thought content normal.         ED Course   Procedures  Labs Reviewed   PROTIME-INR - Abnormal        Result Value    Prothrombin Time 36.8 (*)     INR 3.6 (*)           Imaging Results    None          Medications   tranexamic acid injection Soln 1,000 mg (1,000 mg Topical (Top) Given 3/16/25 1107)   LIDOcaine-EPINEPHrine 1%-1:100,000 injection 1 mL (1 mL Intradermal Given 3/16/25 1251)     Medical Decision Making  79-year-old male with history of AFib, CAD, diverticulitis, diabetes presents to ED for continued bleeding from mole removal 1 week ago.  Seen in ED for same yesterday, however bleed restarted.  Dressing removed which was covered in blood.  Surgicel other gauze noted.  INR checked and was 3.6, will withhold today's Coumadin and have patient follow up with primary care for recheck of INR.  Applied TXA onto wound for 30 minutes with continued bleeding.  Attempted Surgicel and Dermabond, which also failed.  Small wheel with 1% lidocaine with epi and then used an electrical cautery pen to cauterize the bleeding.  Patient observed and no recurrent bleeding.  Advised patient to follow up with Dermatology.  Return precautions given.    Amount and/or Complexity of Data Reviewed  Labs: ordered.    Risk  Prescription drug management.                                      Clinical Impression:  Final diagnoses:  [T14.8XXA] Bleeding from wound (Primary)          ED Disposition Condition    Discharge Stable          ED Prescriptions    None       Follow-up Information       Follow up With Specialties Details Why Contact Info    [facility] [address] [address] [facility] [address]  [phone]                 [1]   Social History  Tobacco Use    Smoking status: Never    Smokeless tobacco: Never   Substance Use Topics    Alcohol use: Yes     Alcohol/week: 20.0 standard drinks of alcohol     Types: 20 Cans of beer per week    Drug use: Never        Natalio Rosario NP  03/16/25 1959

## 2025-04-04 DIAGNOSIS — Z86.0100 HX OF COLONIC POLYPS: ICD-10-CM

## 2025-04-04 DIAGNOSIS — Z12.11 SPECIAL SCREENING FOR MALIGNANT NEOPLASMS, COLON: Primary | ICD-10-CM

## 2025-04-04 DIAGNOSIS — R79.1 ABNORMAL COAGULATION PROFILE: ICD-10-CM

## 2025-04-04 RX ORDER — SODIUM CHLORIDE 0.9 % (FLUSH) 0.9 %
10 SYRINGE (ML) INJECTION
OUTPATIENT
Start: 2025-04-04

## 2025-04-04 RX ORDER — SODIUM CHLORIDE 9 MG/ML
INJECTION, SOLUTION INTRAVENOUS CONTINUOUS
OUTPATIENT
Start: 2025-04-04

## 2025-04-07 ENCOUNTER — ANESTHESIA EVENT (OUTPATIENT)
Dept: ENDOSCOPY | Facility: HOSPITAL | Age: 80
End: 2025-04-07
Payer: MEDICARE

## 2025-04-07 ENCOUNTER — HOSPITAL ENCOUNTER (OUTPATIENT)
Dept: PREADMISSION TESTING | Facility: HOSPITAL | Age: 80
Discharge: HOME | End: 2025-04-07
Attending: SURGERY
Payer: MEDICARE

## 2025-04-07 ENCOUNTER — LAB VISIT (OUTPATIENT)
Dept: LAB | Facility: HOSPITAL | Age: 80
End: 2025-04-07
Attending: SURGERY
Payer: MEDICARE

## 2025-04-07 VITALS — BODY MASS INDEX: 30.78 KG/M2 | WEIGHT: 215 LBS | HEIGHT: 70 IN

## 2025-04-07 DIAGNOSIS — R79.1 ABNORMAL COAGULATION PROFILE: ICD-10-CM

## 2025-04-07 DIAGNOSIS — Z86.0100 HX OF COLONIC POLYPS: ICD-10-CM

## 2025-04-07 DIAGNOSIS — Z12.11 SPECIAL SCREENING FOR MALIGNANT NEOPLASMS, COLON: ICD-10-CM

## 2025-04-07 LAB
ALBUMIN SERPL BCP-MCNC: 3.9 G/DL (ref 3.5–5.2)
ALP SERPL-CCNC: 45 UNIT/L (ref 40–150)
ALT SERPL W/O P-5'-P-CCNC: 39 UNIT/L (ref 10–44)
ANION GAP (OHS): 8 MMOL/L (ref 8–16)
APTT PPP: 29.9 SECONDS (ref 21–32)
AST SERPL-CCNC: 39 UNIT/L (ref 11–45)
BILIRUB SERPL-MCNC: 1.5 MG/DL (ref 0.1–1)
BUN SERPL-MCNC: 13 MG/DL (ref 8–23)
CALCIUM SERPL-MCNC: 9.3 MG/DL (ref 8.7–10.5)
CHLORIDE SERPL-SCNC: 105 MMOL/L (ref 95–110)
CO2 SERPL-SCNC: 26 MMOL/L (ref 23–29)
CREAT SERPL-MCNC: 1 MG/DL (ref 0.5–1.4)
ERYTHROCYTE [DISTWIDTH] IN BLOOD BY AUTOMATED COUNT: 14.1 % (ref 11.5–14.5)
GFR SERPLBLD CREATININE-BSD FMLA CKD-EPI: >60 ML/MIN/1.73/M2
GLUCOSE SERPL-MCNC: 146 MG/DL (ref 70–110)
HCT VFR BLD AUTO: 45.5 % (ref 40–54)
HGB BLD-MCNC: 15 GM/DL (ref 14–18)
INR PPP: 1.2 (ref 0.8–1.2)
MCH RBC QN AUTO: 30.9 PG (ref 27–31)
MCHC RBC AUTO-ENTMCNC: 33 G/DL (ref 32–36)
MCV RBC AUTO: 94 FL (ref 82–98)
PLATELET # BLD AUTO: 151 K/UL (ref 150–450)
PMV BLD AUTO: 10 FL (ref 9.2–12.9)
POTASSIUM SERPL-SCNC: 4.4 MMOL/L (ref 3.5–5.1)
PROT SERPL-MCNC: 7.2 GM/DL (ref 6–8.4)
PROTHROMBIN TIME: 13.4 SECONDS (ref 9–12.5)
RBC # BLD AUTO: 4.85 M/UL (ref 4.6–6.2)
SODIUM SERPL-SCNC: 139 MMOL/L (ref 136–145)
WBC # BLD AUTO: 6.55 K/UL (ref 3.9–12.7)

## 2025-04-07 PROCEDURE — 85027 COMPLETE CBC AUTOMATED: CPT

## 2025-04-07 PROCEDURE — 36415 COLL VENOUS BLD VENIPUNCTURE: CPT

## 2025-04-07 PROCEDURE — 82040 ASSAY OF SERUM ALBUMIN: CPT

## 2025-04-07 PROCEDURE — 85610 PROTHROMBIN TIME: CPT

## 2025-04-07 PROCEDURE — 85730 THROMBOPLASTIN TIME PARTIAL: CPT

## 2025-04-07 NOTE — DISCHARGE INSTRUCTIONS
BEFORE THE PROCEDURE:    REPORT ANY CHANGE IN YOUR PHYSICAL CONDITION TO YOUR DOCTOR IMMEDIATELY.  SELF ISOLATE AND CHECK TEMPERATURE DAILY, IF TEMP OVER 100, CALL PHYSICIAN IMMEDIATELY.    TRY TO REFRAIN FROM SMOKING AND ALCOHOL 72 HOURS BEFORE YOUR PROCEDURE.     THE DAY BEFORE YOUR PROCEDURE YOU WILL BE ON A  LIQUID DIET FROM WAKING IN THE MORNING UNTIL MIDNIGHT. YOU MAY HAVE WATER ONLY 4 HOURS PRIOR TO ARRIVAL.     REFER TO YOUR PHYSICIANS INSTRUCTIONS ON LIQUID DIET AND COLON PREP.    SOMEONE WILL CALL YOU THE DAY BEFORE YOUR PROCEDURE WITH A CHECK-IN TIME FOR YOUR PROCEDURE.    CHECK IN AT FIRST FLOOR REGISTRATION DESK.     DAY OF YOUR PROCEDURE:    NO MAKE UP, NAIL POLISH OR JEWELRY.  TAKE BLOOD PRESSURE MEDICATIONS THE MORNING OF YOUR PROCEDURE, WITH SMALL SIPS WATER, AS DIRECTED BY YOUR PHYSICIAN.   DO NOT TAKE ANY DIABETIC MEDICATIONS UNLESS DIRECTED TO DO SO BY YOUR PHYSICIAN.   CONTACT LENSES AND DENTURES MUST BE REMOVED.  A RESPONSIBLE ADULT MUST ACCOMPANY YOU HOME UPON DISCHARGE.   ONLY 1 VISITOR ALLOWED PER ROOM.     YOUR THOUGHTS AND OPINIONS HELP US TO BETTER SERVE YOU.     PLEASE PARTICIPATE IN SURVEYS ABOUT YOUR CARE.    THANK YOU FOR CHOOSING [facility].

## 2025-04-07 NOTE — ANESTHESIA PREPROCEDURE EVALUATION
04/07/2025  Tristian Ramirez is a 79 y.o., male.      Pre-op Assessment    I have reviewed the Patient Summary Reports.    I have reviewed the NPO Status.   I have reviewed the Medications.     Review of Systems  Anesthesia Hx:  No problems with previous Anesthesia             Denies Family Hx of Anesthesia complications.    Denies Personal Hx of Anesthesia complications.                    Social:  Non-Smoker       Cardiovascular:     Hypertension, well controlled   CAD  asymptomatic  Dysrhythmias atrial fibrillation     PVD     CIS CLEARED                           Pulmonary:        Sleep Apnea           Education provided regarding risk of obstructive sleep apnea            Renal/:  Renal/ Normal                 Hepatic/GI:  Hepatic/GI Normal                    Musculoskeletal:  Arthritis               Neurological:      Headaches                                 Endocrine:  Diabetes, well controlled, type 2           Psych:  Psychiatric History anxiety depression              Lab Results   Component Value Date    WBC 6.55 04/07/2025    HGB 15.0 04/07/2025    HCT 45.5 04/07/2025    MCV 94 04/07/2025     04/07/2025      CMP  Sodium   Date Value Ref Range Status   02/20/2025 139 136 - 145 mmol/L Final     Potassium   Date Value Ref Range Status   02/20/2025 4.6 3.5 - 5.1 mmol/L Final     Chloride   Date Value Ref Range Status   02/20/2025 102 95 - 110 mmol/L Final     CO2   Date Value Ref Range Status   02/20/2025 27 23 - 29 mmol/L Final     Glucose   Date Value Ref Range Status   02/20/2025 133 (H) 70 - 110 mg/dL Final     BUN   Date Value Ref Range Status   02/20/2025 14 8 - 23 mg/dL Final     Creatinine   Date Value Ref Range Status   02/20/2025 0.9 0.5 - 1.4 mg/dL Final     Calcium   Date Value Ref Range Status   02/20/2025 9.7 8.7 - 10.5 mg/dL Final     Total Protein   Date Value Ref Range  Status   02/20/2025 7.6 6.0 - 8.4 g/dL Final     Albumin   Date Value Ref Range Status   02/20/2025 4.1 3.5 - 5.2 g/dL Final     Total Bilirubin   Date Value Ref Range Status   02/20/2025 1.8 (H) 0.1 - 1.0 mg/dL Final     Comment:     For infants and newborns, interpretation of results should be based  on gestational age, weight and in agreement with clinical  observations.    Premature Infant recommended reference ranges:  Up to 24 hours.............<8.0 mg/dL  Up to 48 hours............<12.0 mg/dL  3-5 days..................<15.0 mg/dL  6-29 days.................<15.0 mg/dL       Alkaline Phosphatase   Date Value Ref Range Status   02/20/2025 53 (L) 55 - 135 U/L Final     AST   Date Value Ref Range Status   02/20/2025 33 10 - 40 U/L Final     ALT   Date Value Ref Range Status   02/20/2025 31 10 - 44 U/L Final     Anion Gap   Date Value Ref Range Status   02/20/2025 10 8 - 16 mmol/L Final     eGFR   Date Value Ref Range Status   02/20/2025 >60.0 >60 mL/min/1.73 m^2 Final        Physical Exam  General: Well nourished    Airway:  Mallampati: III / II  Mouth Opening: Normal  TM Distance: Normal  Tongue: Normal  Neck ROM: Normal ROM    Dental:  Intact    Chest/Lungs:  Clear to auscultation    Heart:  Rate: Normal  Rhythm: Regular Rhythm  Sounds: Normal        Anesthesia Plan  Type of Anesthesia, risks & benefits discussed:    Anesthesia Type: MAC  Intra-op Monitoring Plan: Standard ASA Monitors  Post Op Pain Control Plan: multimodal analgesia  Induction:  IV  Airway Plan: Direct  Informed Consent: Informed consent signed with the Patient and all parties understand the risks and agree with anesthesia plan.  All questions answered.   ASA Score: 4  Day of Surgery Review of History & Physical: I have interviewed and examined the patient. I have reviewed the patient's H&P dated: There are no significant changes.     Ready For Surgery From Anesthesia Perspective.     .

## 2025-04-08 ENCOUNTER — ANESTHESIA (OUTPATIENT)
Dept: ENDOSCOPY | Facility: HOSPITAL | Age: 80
End: 2025-04-08
Payer: MEDICARE

## 2025-04-09 NOTE — PRE-PROCEDURE INSTRUCTIONS
Reviewed pt's history, EKG results, and cardiac clearance note with Dr. Jimenez. No further intervention needed. Ok to proceed with procedure as scheduled.

## 2025-04-15 ENCOUNTER — HOSPITAL ENCOUNTER (OUTPATIENT)
Facility: HOSPITAL | Age: 80
Discharge: HOME | End: 2025-04-15
Attending: SURGERY | Admitting: SURGERY
Payer: MEDICARE

## 2025-04-15 VITALS
DIASTOLIC BLOOD PRESSURE: 77 MMHG | SYSTOLIC BLOOD PRESSURE: 137 MMHG | HEART RATE: 78 BPM | OXYGEN SATURATION: 97 % | RESPIRATION RATE: 18 BRPM | TEMPERATURE: 98 F

## 2025-04-15 DIAGNOSIS — Z12.11 SPECIAL SCREENING FOR MALIGNANT NEOPLASMS, COLON: ICD-10-CM

## 2025-04-15 DIAGNOSIS — K57.90 DIVERTICULOSIS: ICD-10-CM

## 2025-04-15 DIAGNOSIS — Z86.0100 HX OF COLONIC POLYPS: Primary | ICD-10-CM

## 2025-04-15 LAB — POCT GLUCOSE: 162 MG/DL (ref 70–110)

## 2025-04-15 PROCEDURE — 37000008 HC ANESTHESIA 1ST 15 MINUTES: Performed by: SURGERY

## 2025-04-15 PROCEDURE — A4216 STERILE WATER/SALINE, 10 ML: HCPCS | Performed by: SURGERY

## 2025-04-15 PROCEDURE — 63600175 PHARM REV CODE 636 W HCPCS: Performed by: NURSE ANESTHETIST, CERTIFIED REGISTERED

## 2025-04-15 PROCEDURE — 25000003 PHARM REV CODE 250: Performed by: SURGERY

## 2025-04-15 PROCEDURE — G0104 CA SCREEN;FLEXI SIGMOIDSCOPE: HCPCS | Performed by: SURGERY

## 2025-04-15 RX ORDER — SODIUM CHLORIDE 0.9 % (FLUSH) 0.9 %
10 SYRINGE (ML) INJECTION
Status: DISCONTINUED | OUTPATIENT
Start: 2025-04-15 | End: 2025-04-15 | Stop reason: HOSPADM

## 2025-04-15 RX ORDER — SODIUM CHLORIDE 9 MG/ML
INJECTION, SOLUTION INTRAVENOUS CONTINUOUS
Status: DISCONTINUED | OUTPATIENT
Start: 2025-04-15 | End: 2025-04-15 | Stop reason: HOSPADM

## 2025-04-15 RX ORDER — PROPOFOL 10 MG/ML
VIAL (ML) INTRAVENOUS
Status: DISCONTINUED | OUTPATIENT
Start: 2025-04-15 | End: 2025-04-15

## 2025-04-15 RX ADMIN — SODIUM CHLORIDE 10 ML: 9 INJECTION INTRAMUSCULAR; INTRAVENOUS; SUBCUTANEOUS at 01:04

## 2025-04-15 RX ADMIN — PROPOFOL 50 MG: 10 INJECTION, EMULSION INTRAVENOUS at 01:04

## 2025-04-15 NOTE — TRANSFER OF CARE
Anesthesia Transfer of Care Note    Patient: Tristian Ramirez    Procedure(s) Performed: Procedure(s) (LRB):  SIGMOIDOSCOPY, FLEXIBLE (N/A)    Patient location: GI    Anesthesia Type: MAC    Transport from OR: Transported from OR on room air with adequate spontaneous ventilation    Post pain: adequate analgesia    Post assessment: no apparent anesthetic complications    Post vital signs: stable    Level of consciousness: awake    Nausea/Vomiting: no nausea/vomiting    Complications: none    Transfer of care protocol was followed      Last vitals:   165/95  16 RR  78 HR  98% O 2SAT

## 2025-04-15 NOTE — OP NOTE
[facility] - Endoscopy  Colonoscopy Procedure  Operative Note    SUMMARY     Date of Procedure: 4/15/2025     Procedure: Procedure(s) (LRB):  SIGMOIDOSCOPY, FLEXIBLE (N/A)    Surgeons and Role:     * Leyda Weinberg MD - Primary    Assisting Surgeon: None     Patient location: GI    Pre-Operative Diagnosis: Special screening for malignant neoplasms, colon [Z12.11]  Hx of colonic polyps [Z86.0100]    Post-Operative Diagnosis: Post-Op Diagnosis Codes:     * Special screening for malignant neoplasms, colon [Z12.11]     * Hx of colonic polyps [Z86.0100]  Poor prep  Diverticulosis     Indications: screening age with history of polyps          Procedure:                  The patient was brought in to the endoscopy suite where the risks, benefits, and alternatives of the procedure were described.  The patient was given the opportunity to ask questions and then signed informed consent.  Patient was positioned in the left lateral decubitus position, continuous monitoring was initiated, and supplemental oxygen was provided via nasal cannula.  Adequate sedation was achieved with the above mentioned medications and then titrated during the entire procedure.  Digital rectal exam was performed.  Under direct visualization the colonoscope was introduced through the anus in to the rectum.  The scope was then advanced to the sigmoid colon before aborted due to prep quality and diverticulosis.  Scope was then withdrawn and the mucosa was carefully examined in a circular fashion.  The entire colonic mucosa was examined but very limited.  Air was evacuated from the colon and the procedure was terminated.  The patient tolerated the procedure well and was able to be transferred to the recovery area in stable condition.    Findings:                 Digital rectal examination:  No palpable abnormality or significant hemorrhoidal disease                    Rectum:  No mucosal abnormalities                    Sigmoid:  No mucosal  abnormality       Descending, Transverse, Ascending, Cecum, and TerminalIleum:  Not intubated     Specimens:   Specimen (24h ago, onward)      None              Estimated Blood Loss (EBL): * No values recorded between 4/15/2025  1:22 PM and 4/15/2025  1:36 PM *     Complications: No     Diagnostic Impression: Diverticulosis, Poor prep     Recommendations: Discharge patient to home. Patient has a contact number available for emergencies. The signs and symptoms of potential delayed complications were discussed with the patient. Return to normal activities tomorrow. Written discharge instructions were provided to the patient. Resume previous diet. Continue present medications. Repeat procedure in few weeks.    Disposition:  To recovery unit stable     Attestation: I performed the procedure.        Follow Up:             Future Appointments   Date Time Provider Department Center   6/24/2025 11:30 AM Amol Ellis III, MD [facility]           Leyda Weinberg MD  4/15/2025

## 2025-04-15 NOTE — DISCHARGE INSTRUCTIONS
CALL YOUR DOCTOR FOR ANY QUESTIONS OR CONCERNS.    RETURN TO THE OFFICE FOR FOLLOW UP WITH DR. ORO AS SCHEDULED.    DIET-REGULAR    RESUME HOME MEDICATIONS.    DO NOT DRIVE OR DRINK ANY ALCOHOL FOR THE NEXT 24 HOURS.

## 2025-04-15 NOTE — DISCHARGE SUMMARY
Discharge Summary  General Surgery      Admit Date: 4/15/2025    Discharge Date :4/15/2025    Attending Physician: Leyda Weinberg     Discharge Physician: Leyda Weinberg    Discharged Condition: good    Discharge Diagnosis: Special screening for malignant neoplasms, colon [Z12.11]  Hx of colonic polyps [Z86.0100]    Treatments/Procedures: Procedure(s) (LRB):  SIGMOIDOSCOPY, FLEXIBLE (N/A)    Hospital Course: Uneventful; Discharged home from Recovery    Significant Diagnostic Studies: none    Disposition: Home or Self Care    Diet: Regular    Follow up: Office 10-14 days    Activity: No heavy lifting till seen in office.    Patient Instructions:   Current Discharge Medication List        CONTINUE these medications which have NOT CHANGED    Details   ABC COMPLETE SENIOR MEN'S 515--300 mcg Tab TAKE ONE TABLET BY MOUTH ONCE A DAY THANK YOU      allopurinoL (ZYLOPRIM) 300 MG tablet TAKE ONE TABLET BY MOUTH ONCE A DAY **THANK YOU**  Qty: 90 tablet, Refills: 3    Comments: This prescription was filled on 11/20/2023. Any refills authorized will be placed on file.  Associated Diagnoses: Gout, unspecified cause, unspecified chronicity, unspecified site      amLODIPine (NORVASC) 5 MG tablet take one tablet by mouth ONCE daily (AT THE SAME TIME EACH DAY) TO CONTROL blood pressure. thank you!!!  Qty: 90 tablet, Refills: 3    Comments: This prescription was filled on 4/19/2024. Any refills authorized will be placed on file. - .  Associated Diagnoses: Hypertension, unspecified type      aspirin (ECOTRIN) 81 MG EC tablet TAKE ONE TABLET BY MOUTH ONCE A DAY THANK YOU      cetirizine (ZYRTEC) 10 MG tablet TAKE ONE TABLET BY MOUTH EVERY MORNING FOR SINUS THANK YOU      FISH OIL 1,200 (144-216) mg Cap TAKE ONE CAPSULE BY MOUTH AT BEDTIME THANK YOU      FLUoxetine 20 MG capsule TAKE ONE CAPSULE BY MOUTH ONCE A DAY for anxiety/depression **THANK YOU**  Qty: 30 capsule, Refills: 6    Comments: This prescription was filled on  12/17/2024. Any refills authorized will be placed on file.  Associated Diagnoses: Other mixed anxiety disorders      folic acid (FOLVITE) 1 MG tablet Take 1 tablet (1 mg total) by mouth once daily.  Qty: 90 tablet, Refills: 3      gabapentin (NEURONTIN) 300 MG capsule TAKE ONE CAPSULE BY MOUTH AT BEDTIME for neuropathic pain **THANK YOU**  Qty: 90 capsule, Refills: 3    Comments: This prescription was filled on 6/22/2023. Any refills authorized will be placed on file.  Associated Diagnoses: Neuropathic pain      metoprolol succinate (TOPROL-XL) 50 MG 24 hr tablet Take 1 tablet (50 mg total) by mouth once daily.  Qty: 90 tablet, Refills: 3    Comments: .      pravastatin (PRAVACHOL) 80 MG tablet Take 1 tablet (80 mg total) by mouth once daily.  Qty: 90 tablet, Refills: 3      tamsulosin (FLOMAX) 0.4 mg Cap TAKE TWO CAPSULES BY MOUTH AT BEDTIME for prostate **THANK YOU**  Qty: 180 capsule, Refills: 3    Comments: This prescription was filled on 10/19/2023. Any refills authorized will be placed on file.  Associated Diagnoses: Enlarged prostate      TRIJARDY XR 10-5-1,000 mg TBph Take 1 tablet by mouth Daily.  Qty: 90 tablet, Refills: 3    Associated Diagnoses: Controlled diabetes mellitus type 2 with complications, unspecified whether long term insulin use      zolpidem (AMBIEN) 10 mg Tab Take 1 tablet (10 mg total) by mouth nightly as needed (insomnia).  Qty: 30 tablet, Refills: 3    Associated Diagnoses: Insomnia, unspecified type      !! JANTOVEN 5 mg tablet Take 1 tablet by mouth as directed by [facility] THANK YOU      !! warfarin (COUMADIN) 2 MG tablet Take SEVEN MG by MOUTH ONCE A DAY OR as as directed by [facility]       !! - Potential duplicate medications found. Please discuss with provider.        STOP taking these medications       sildenafiL (VIAGRA) 100 MG tablet Comments:   Reason for Stopping:               Discharge Procedure Orders   Diet general     Call MD for:  temperature >100.4     Call MD for:  persistent  nausea and vomiting     Call MD for:  difficulty breathing, headache or visual disturbances     Call MD for:  severe uncontrolled pain     Call MD for:  persistent dizziness or light-headedness     Call MD for:  extreme fatigue     Activity as tolerated

## 2025-04-15 NOTE — PLAN OF CARE
Back in room; no complaints voiced.  Saline lock dc/ed intact.  Up to bathroom; getting dressed to go home.  Daughter present in room. Patient aware prep was not good and test will need to be repeated.

## 2025-04-16 NOTE — ANESTHESIA POSTPROCEDURE EVALUATION
Anesthesia Post Evaluation    Patient: Tristian Ramirez    Procedure(s) Performed: Procedure(s) (LRB):  SIGMOIDOSCOPY, FLEXIBLE (N/A)    Final Anesthesia Type: MAC      Patient location during evaluation: OPS  Patient participation: Yes- Able to Participate  Level of consciousness: awake  Post-procedure vital signs: reviewed and stable  Pain management: adequate  Airway patency: patent    PONV status at discharge: No PONV  Anesthetic complications: no      Cardiovascular status: blood pressure returned to baseline  Respiratory status: spontaneous ventilation  Hydration status: euvolemic  Follow-up not needed.              Vitals Value Taken Time   /77 04/15/25 13:48   Temp 36.4 °C (97.6 °F) 04/15/25 13:48   Pulse 78 04/15/25 13:48   Resp 18 04/15/25 13:48   SpO2 97 % 04/15/25 13:48         No case tracking events are documented in the log.      Pain/Camelia Score: Camelia Score: 10 (4/15/2025  1:48 PM)

## 2025-04-17 DIAGNOSIS — Z86.0100 HX OF COLONIC POLYPS: ICD-10-CM

## 2025-04-17 DIAGNOSIS — Z12.11 SPECIAL SCREENING FOR MALIGNANT NEOPLASMS, COLON: Primary | ICD-10-CM

## 2025-04-17 RX ORDER — SODIUM CHLORIDE 9 MG/ML
INJECTION, SOLUTION INTRAVENOUS CONTINUOUS
Status: CANCELLED | OUTPATIENT
Start: 2025-04-17

## 2025-04-17 RX ORDER — SODIUM CHLORIDE 9 MG/ML
INJECTION, SOLUTION INTRAVENOUS CONTINUOUS
OUTPATIENT
Start: 2025-04-17

## 2025-04-17 RX ORDER — SODIUM CHLORIDE 0.9 % (FLUSH) 0.9 %
10 SYRINGE (ML) INJECTION
OUTPATIENT
Start: 2025-04-17

## 2025-04-17 RX ORDER — SODIUM CHLORIDE 0.9 % (FLUSH) 0.9 %
SYRINGE (ML) INJECTION
Status: CANCELLED | OUTPATIENT
Start: 2025-04-17

## 2025-04-25 ENCOUNTER — ANESTHESIA EVENT (OUTPATIENT)
Dept: ENDOSCOPY | Facility: HOSPITAL | Age: 80
End: 2025-04-25
Payer: MEDICARE

## 2025-04-25 NOTE — ANESTHESIA PREPROCEDURE EVALUATION
04/25/2025  Tristian Ramirez is a 79 y.o., male.      Pre-op Assessment    I have reviewed the Patient Summary Reports.    I have reviewed the NPO Status.   I have reviewed the Medications.     Review of Systems  Anesthesia Hx:  No problems with previous Anesthesia             Denies Family Hx of Anesthesia complications.    Denies Personal Hx of Anesthesia complications.                    Social:  Non-Smoker       Cardiovascular:     Hypertension, well controlled   CAD  asymptomatic  Dysrhythmias atrial fibrillation     PVD    ECG has been reviewed.                            Pulmonary:        Sleep Apnea           Education provided regarding risk of obstructive sleep apnea            Renal/:  Renal/ Normal                 Hepatic/GI:  Hepatic/GI Normal                    Musculoskeletal:  Arthritis               Neurological:      Headaches                                 Endocrine:  Diabetes, well controlled, type 2           Psych:  Psychiatric History anxiety depression              Lab Results   Component Value Date    WBC 6.55 04/07/2025    HGB 15.0 04/07/2025    HCT 45.5 04/07/2025    MCV 94 04/07/2025     04/07/2025      CMP  Sodium   Date Value Ref Range Status   04/07/2025 139 136 - 145 mmol/L Final     Comment:     Pre-admit testing   02/20/2025 139 136 - 145 mmol/L Final     Potassium   Date Value Ref Range Status   04/07/2025 4.4 3.5 - 5.1 mmol/L Final     Comment:     Pre-admit testing   02/20/2025 4.6 3.5 - 5.1 mmol/L Final     Chloride   Date Value Ref Range Status   04/07/2025 105 95 - 110 mmol/L Final     Comment:     Pre-admit testing   02/20/2025 102 95 - 110 mmol/L Final     CO2   Date Value Ref Range Status   04/07/2025 26 23 - 29 mmol/L Final     Comment:     Pre-admit testing   02/20/2025 27 23 - 29 mmol/L Final     Glucose   Date Value Ref Range Status   02/20/2025 133  (H) 70 - 110 mg/dL Final     BUN   Date Value Ref Range Status   04/07/2025 13 8 - 23 mg/dL Final     Comment:     Pre-admit testing     Creatinine   Date Value Ref Range Status   04/07/2025 1.0 0.5 - 1.4 mg/dL Final     Comment:     Pre-admit testing     Calcium   Date Value Ref Range Status   04/07/2025 9.3 8.7 - 10.5 mg/dL Final     Comment:     Pre-admit testing   02/20/2025 9.7 8.7 - 10.5 mg/dL Final     Total Protein   Date Value Ref Range Status   02/20/2025 7.6 6.0 - 8.4 g/dL Final     Albumin   Date Value Ref Range Status   04/07/2025 3.9 3.5 - 5.2 g/dL Final     Comment:     Pre-admit testing   02/20/2025 4.1 3.5 - 5.2 g/dL Final     Total Bilirubin   Date Value Ref Range Status   02/20/2025 1.8 (H) 0.1 - 1.0 mg/dL Final     Comment:     For infants and newborns, interpretation of results should be based  on gestational age, weight and in agreement with clinical  observations.    Premature Infant recommended reference ranges:  Up to 24 hours.............<8.0 mg/dL  Up to 48 hours............<12.0 mg/dL  3-5 days..................<15.0 mg/dL  6-29 days.................<15.0 mg/dL       Bilirubin Total   Date Value Ref Range Status   04/07/2025 1.5 (H) 0.1 - 1.0 mg/dL Final     Comment:     Pre-admit testing  For infants and newborns, interpretation of results should be based   on gestational age, weight and in agreement with clinical   observations.    Premature Infant recommended reference ranges:   0-24 hours:  <8.0 mg/dL   24-48 hours: <12.0 mg/dL   3-5 days:    <15.0 mg/dL   6-29 days:   <15.0 mg/dL     Alkaline Phosphatase   Date Value Ref Range Status   02/20/2025 53 (L) 55 - 135 U/L Final     ALP   Date Value Ref Range Status   04/07/2025 45 40 - 150 unit/L Final     Comment:     Pre-admit testing     AST   Date Value Ref Range Status   04/07/2025 39 11 - 45 unit/L Final     Comment:     Pre-admit testing   02/20/2025 33 10 - 40 U/L Final     ALT   Date Value Ref Range Status   04/07/2025 39 10 - 44  unit/L Final     Comment:     Pre-admit testing   02/20/2025 31 10 - 44 U/L Final     Anion Gap   Date Value Ref Range Status   04/07/2025 8 8 - 16 mmol/L Final     eGFR   Date Value Ref Range Status   04/07/2025 >60 >60 mL/min/1.73/m2 Final     Comment:     Estimated GFR calculated using the CKD-EPI creatinine (2021) equation.   02/20/2025 >60.0 >60 mL/min/1.73 m^2 Final        Physical Exam  General: Well nourished    Airway:  Mallampati: II / II  Mouth Opening: Normal  TM Distance: Normal  Tongue: Normal  Neck ROM: Normal ROM    Dental:  Intact    Chest/Lungs:  Clear to auscultation    Heart:  Rate: Normal  Rhythm: Frequent Prematures  Sounds: Normal        Anesthesia Plan  Type of Anesthesia, risks & benefits discussed:    Anesthesia Type: MAC  Intra-op Monitoring Plan: Standard ASA Monitors  Post Op Pain Control Plan: multimodal analgesia  Induction:  IV  Airway Plan: Direct  Informed Consent: Informed consent signed with the Patient and all parties understand the risks and agree with anesthesia plan.  All questions answered.   ASA Score: 4  Day of Surgery Review of History & Physical: I have interviewed and examined the patient. I have reviewed the patient's H&P dated: There are no significant changes.     Ready For Surgery From Anesthesia Perspective.     .

## 2025-04-28 ENCOUNTER — ANESTHESIA (OUTPATIENT)
Dept: ENDOSCOPY | Facility: HOSPITAL | Age: 80
End: 2025-04-28
Payer: MEDICARE

## 2025-04-28 ENCOUNTER — HOSPITAL ENCOUNTER (OUTPATIENT)
Facility: HOSPITAL | Age: 80
Discharge: HOME | End: 2025-04-28
Attending: SURGERY | Admitting: SURGERY
Payer: MEDICARE

## 2025-04-28 DIAGNOSIS — Z12.11 SPECIAL SCREENING FOR MALIGNANT NEOPLASMS, COLON: ICD-10-CM

## 2025-04-28 DIAGNOSIS — K57.90 DIVERTICULOSIS: ICD-10-CM

## 2025-04-28 DIAGNOSIS — Z86.0100 HX OF COLONIC POLYPS: Primary | ICD-10-CM

## 2025-04-28 LAB — POCT GLUCOSE: 157 MG/DL (ref 70–110)

## 2025-04-28 PROCEDURE — 25000003 PHARM REV CODE 250: Performed by: SURGERY

## 2025-04-28 PROCEDURE — 37000008 HC ANESTHESIA 1ST 15 MINUTES: Performed by: SURGERY

## 2025-04-28 PROCEDURE — G0105 COLORECTAL SCRN; HI RISK IND: HCPCS | Performed by: SURGERY

## 2025-04-28 PROCEDURE — A4216 STERILE WATER/SALINE, 10 ML: HCPCS | Performed by: SURGERY

## 2025-04-28 PROCEDURE — 63600175 PHARM REV CODE 636 W HCPCS: Performed by: NURSE ANESTHETIST, CERTIFIED REGISTERED

## 2025-04-28 PROCEDURE — 82962 GLUCOSE BLOOD TEST: CPT | Performed by: SURGERY

## 2025-04-28 PROCEDURE — 37000009 HC ANESTHESIA EA ADD 15 MINS: Performed by: SURGERY

## 2025-04-28 RX ORDER — PROPOFOL 10 MG/ML
VIAL (ML) INTRAVENOUS
Status: DISCONTINUED | OUTPATIENT
Start: 2025-04-28 | End: 2025-04-28

## 2025-04-28 RX ORDER — SODIUM CHLORIDE 9 MG/ML
INJECTION, SOLUTION INTRAVENOUS CONTINUOUS
Status: DISCONTINUED | OUTPATIENT
Start: 2025-04-28 | End: 2025-04-28 | Stop reason: HOSPADM

## 2025-04-28 RX ORDER — SODIUM CHLORIDE 0.9 % (FLUSH) 0.9 %
10 SYRINGE (ML) INJECTION
Status: DISCONTINUED | OUTPATIENT
Start: 2025-04-28 | End: 2025-04-28 | Stop reason: HOSPADM

## 2025-04-28 RX ADMIN — PROPOFOL 50 MG: 10 INJECTION, EMULSION INTRAVENOUS at 10:04

## 2025-04-28 RX ADMIN — SODIUM CHLORIDE 10 ML: 9 INJECTION INTRAMUSCULAR; INTRAVENOUS; SUBCUTANEOUS at 10:04

## 2025-04-28 RX ADMIN — SODIUM CHLORIDE 10 ML: 9 INJECTION INTRAMUSCULAR; INTRAVENOUS; SUBCUTANEOUS at 09:04

## 2025-04-28 RX ADMIN — PROPOFOL 50 MG: 10 INJECTION, EMULSION INTRAVENOUS at 09:04

## 2025-04-28 NOTE — PLAN OF CARE
Patient returned to 5th floor post procedure in stable condition. AAOx3. Denies any pain. Passing flatus without difficulty. VS stable. Snacks and fluids offered, placed at the bedside. HOB elevated. Bed locked and in lowest position to the floor. Call bell placed in reach for any needs.

## 2025-04-28 NOTE — OP NOTE
[facility] - Endoscopy  Colonoscopy Procedure  Operative Note    SUMMARY     Date of Procedure: 4/28/2025     Procedure: Procedure(s) (LRB):  COLONOSCOPY, SCREENING, HIGH RISK PATIENT (N/A)    Surgeons and Role:     * Leyda Weinberg MD - Primary    Assisting Surgeon: None     Patient location: GI    Pre-Operative Diagnosis: Special screening for malignant neoplasms, colon [Z12.11]  Hx of colonic polyps [Z86.0100]    Post-Operative Diagnosis: Post-Op Diagnosis Codes:     * Special screening for malignant neoplasms, colon [Z12.11]     * Hx of colonic polyps [Z86.0100]  Diverticulosis     Indications:  Screening age with need for repeat given previous history of polyps          Procedure:                  The patient was brought in to the endoscopy suite where the risks, benefits, and alternatives of the procedure were described.  The patient was given the opportunity to ask questions and then signed informed consent.  Patient was positioned in the left lateral decubitus position, continuous monitoring was initiated, and supplemental oxygen was provided via nasal cannula.  Adequate sedation was achieved with the above mentioned medications and then titrated during the entire procedure.  Digital rectal exam was performed.  Under direct visualization the colonoscope was introduced through the anus in to the rectum.  The scope was then advanced to the cecum, which was identified by the ileocecal valve and appendiceal orifice.  Scope was then withdrawn and the mucosa was carefully examined in a circular fashion.  The entire colonic mucosa was examined.  Air was evacuated from the colon and the procedure was terminated.  The patient tolerated the procedure well and was able to be transferred to the recovery area in stable condition.    Findings:                 Digital rectal examination:  No palpable abnormality or significant hemorrhoidal disease                    Rectum:  No mucosal abnormalities                     Sigmoid:  Extensive diverticulosis        Descending:  Scant diverticulosis       Transverse:  No mucosal abnormalities         Ascending:  Scant diverticulosis        Cecum:  Scant diverticulosis  Appendiceal orifice and ileocecal valve appreciated.        Terminal Ileum:  Not intubated     Specimens:   Specimen (24h ago, onward)      None              Estimated Blood Loss (EBL): * No values recorded between 4/28/2025  9:56 AM and 4/28/2025 10:14 AM *     Complications: No     Diagnostic Impression:  Severe diverticulosis/pan diverticulosis     Recommendations: Discharge patient to home. Patient has a contact number available for emergencies. The signs and symptoms of potential delayed complications were discussed with the patient. Return to normal activities tomorrow. Written discharge instructions were provided to the patient. Resume previous diet. Continue present medications. Repeat procedure in never as patient will have surpassed screening age.    Disposition:  Recovery unit stable     Attestation: I performed the procedure.        Follow Up:             Future Appointments   Date Time Provider Department Center   6/24/2025 11:30 AM Amol Ellis III, MD [facility]           Leyda Weinberg MD  4/28/2025

## 2025-04-28 NOTE — ANESTHESIA POSTPROCEDURE EVALUATION
Anesthesia Post Evaluation    Patient: Tristian Ramirez    Procedure(s) Performed: Procedure(s) (LRB):  COLONOSCOPY, SCREENING, HIGH RISK PATIENT (N/A)    Final Anesthesia Type: MAC      Patient location during evaluation: OPS  Patient participation: Yes- Able to Participate  Level of consciousness: awake  Post-procedure vital signs: reviewed and stable  Pain management: adequate  Airway patency: patent    PONV status at discharge: No PONV  Anesthetic complications: no      Cardiovascular status: blood pressure returned to baseline  Respiratory status: spontaneous ventilation  Hydration status: euvolemic  Follow-up not needed.              Vitals Value Taken Time   /90 04/28/25 10:34   Temp 36.4 °C (97.6 °F) 04/28/25 10:34   Pulse 80 04/28/25 10:34   Resp 18 04/28/25 10:34   SpO2 98 % 04/28/25 10:34         No case tracking events are documented in the log.      Pain/Camelia Score: Camelia Score: 10 (4/28/2025 10:34 AM)

## 2025-04-28 NOTE — TRANSFER OF CARE
Anesthesia Transfer of Care Note    Patient: Tristian Ramirez    Procedure(s) Performed: Procedure(s) (LRB):  COLONOSCOPY, SCREENING, HIGH RISK PATIENT (N/A)    Patient location: GI    Anesthesia Type: MAC    Transport from OR: Transported from OR on room air with adequate spontaneous ventilation    Post pain: adequate analgesia    Post assessment: no apparent anesthetic complications    Post vital signs: stable    Level of consciousness: awake    Nausea/Vomiting: no nausea/vomiting    Complications: none    Transfer of care protocol was followed      Last vitals:   90/52  16 RR  81 HR  98% O2 SAT

## 2025-04-28 NOTE — DISCHARGE SUMMARY
Discharge Summary  General Surgery      Admit Date: 4/28/2025    Discharge Date :4/28/2025    Attending Physician: Leyda Weinberg     Discharge Physician: Leyda Weinberg    Discharged Condition: good    Discharge Diagnosis: Special screening for malignant neoplasms, colon [Z12.11]  Hx of colonic polyps [Z86.0100]  Diverticulosis    Treatments/Procedures: Procedure(s) (LRB):  COLONOSCOPY, SCREENING, HIGH RISK PATIENT (N/A)    Hospital Course: Uneventful; Discharged home from Recovery    Significant Diagnostic Studies: none    Disposition: Home or Self Care    Diet:  Diverticular precautions    Follow up: Office 10-14 days    Activity: No restrictions.    Patient Instructions:   Current Discharge Medication List        CONTINUE these medications which have NOT CHANGED    Details   ABC COMPLETE SENIOR MEN'S 102--300 mcg Tab TAKE ONE TABLET BY MOUTH ONCE A DAY THANK YOU      allopurinoL (ZYLOPRIM) 300 MG tablet TAKE ONE TABLET BY MOUTH ONCE A DAY **THANK YOU**  Qty: 90 tablet, Refills: 3    Comments: This prescription was filled on 11/20/2023. Any refills authorized will be placed on file.  Associated Diagnoses: Gout, unspecified cause, unspecified chronicity, unspecified site      amLODIPine (NORVASC) 5 MG tablet take one tablet by mouth ONCE daily (AT THE SAME TIME EACH DAY) TO CONTROL blood pressure. thank you!!!  Qty: 90 tablet, Refills: 3    Comments: This prescription was filled on 4/19/2024. Any refills authorized will be placed on file. - .  Associated Diagnoses: Hypertension, unspecified type      aspirin (ECOTRIN) 81 MG EC tablet TAKE ONE TABLET BY MOUTH ONCE A DAY THANK YOU      FISH OIL 1,200 (144-216) mg Cap TAKE ONE CAPSULE BY MOUTH AT BEDTIME THANK YOU      FLUoxetine 20 MG capsule TAKE ONE CAPSULE BY MOUTH ONCE A DAY for anxiety/depression **THANK YOU**  Qty: 30 capsule, Refills: 6    Comments: This prescription was filled on 12/17/2024. Any refills authorized will be placed on file.  Associated  Diagnoses: Other mixed anxiety disorders      folic acid (FOLVITE) 1 MG tablet Take 1 tablet (1 mg total) by mouth once daily.  Qty: 90 tablet, Refills: 3      gabapentin (NEURONTIN) 300 MG capsule TAKE ONE CAPSULE BY MOUTH AT BEDTIME for neuropathic pain **THANK YOU**  Qty: 90 capsule, Refills: 3    Comments: This prescription was filled on 6/22/2023. Any refills authorized will be placed on file.  Associated Diagnoses: Neuropathic pain      !! JANTOVEN 5 mg tablet Take 1 tablet by mouth as directed by [facility] THANK YOU      metoprolol succinate (TOPROL-XL) 50 MG 24 hr tablet Take 1 tablet (50 mg total) by mouth once daily.  Qty: 90 tablet, Refills: 3    Comments: .      pravastatin (PRAVACHOL) 80 MG tablet Take 1 tablet (80 mg total) by mouth once daily.  Qty: 90 tablet, Refills: 3      tamsulosin (FLOMAX) 0.4 mg Cap TAKE TWO CAPSULES BY MOUTH AT BEDTIME for prostate **THANK YOU**  Qty: 180 capsule, Refills: 3    Comments: This prescription was filled on 10/19/2023. Any refills authorized will be placed on file.  Associated Diagnoses: Enlarged prostate      TRIJARDY XR 10-5-1,000 mg TBph Take 1 tablet by mouth Daily.  Qty: 90 tablet, Refills: 3    Associated Diagnoses: Controlled diabetes mellitus type 2 with complications, unspecified whether long term insulin use      !! warfarin (COUMADIN) 2 MG tablet Take SEVEN MG by MOUTH ONCE A DAY OR as as directed by [facility]      zolpidem (AMBIEN) 10 mg Tab TAKE ONE TABLET BY MOUTH AT BEDTIME AS NEEDED FOR INSOMNIA **THANK YOU**  Qty: 30 tablet, Refills: 3    Comments: This prescription was filled on 4/17/2025. Any refills authorized will be placed on file.  Associated Diagnoses: Insomnia, unspecified type      cetirizine (ZYRTEC) 10 MG tablet TAKE ONE TABLET BY MOUTH EVERY MORNING FOR SINUS THANK YOU      sildenafiL (VIAGRA) 100 MG tablet TAKE ONE TABLET BY MOUTH 30 minutes prior to activity AS DIRECTED. THANK YOU!  Qty: 10 tablet, Refills: 5    Comments: This prescription was  filled on 4/16/2025. Any refills authorized will be placed on file.  Associated Diagnoses: Erectile dysfunction, unspecified erectile dysfunction type       !! - Potential duplicate medications found. Please discuss with provider.          Discharge Procedure Orders   Diet general     Call MD for:  temperature >100.4     Call MD for:  persistent nausea and vomiting     Call MD for:  difficulty breathing, headache or visual disturbances     Call MD for:  persistent dizziness or light-headedness     Call MD for:  extreme fatigue     Call MD for:  severe uncontrolled pain     Activity as tolerated

## 2025-04-28 NOTE — DISCHARGE INSTRUCTIONS
FOLLOW UP WITH DR ORO AS SCHEDULED.    NO DRIVING OR DRINKING ALCOHOL FOR 24 HOURS.    AMBULATE WITH ASSISTANCE AS NEEDED.    CALL DR ORO'S OFFICE FOR ANY QUESTIONS OR CONCERNS.  REPORT TO THE ER IF URGENT.    THANK YOU FOR CHOOSING [facility]!

## 2025-05-02 VITALS
HEART RATE: 80 BPM | DIASTOLIC BLOOD PRESSURE: 90 MMHG | RESPIRATION RATE: 18 BRPM | OXYGEN SATURATION: 98 % | SYSTOLIC BLOOD PRESSURE: 139 MMHG | TEMPERATURE: 98 F

## (undated) DEVICE — GOWN NONREINF SET-IN SLV XL

## (undated) DEVICE — KIT BIOGUARD AIR WTR SUC VALVE

## (undated) DEVICE — KIT COLLECTION MANIFOLD V2

## (undated) DEVICE — ELECTRODE MEDI-TRACE 855 FOAM

## (undated) DEVICE — LINER SUCTION CANNISTER REGUGA

## (undated) DEVICE — CONNECTOR WATERJET ENDO

## (undated) DEVICE — CANNULA SSOFT C02 MALE 14FT

## (undated) DEVICE — SPONGE DRY VIA GREEN

## (undated) DEVICE — KIT VIA CUSTOM PROCEDURE

## (undated) DEVICE — SOL IRRI STRL WATER 1000ML

## (undated) DEVICE — TUBING IRRIGATION W/ AIR

## (undated) DEVICE — UNDERPAD DELUXE FLUFF 30X30IN